# Patient Record
Sex: FEMALE | Race: WHITE | NOT HISPANIC OR LATINO | Employment: OTHER | URBAN - METROPOLITAN AREA
[De-identification: names, ages, dates, MRNs, and addresses within clinical notes are randomized per-mention and may not be internally consistent; named-entity substitution may affect disease eponyms.]

---

## 2023-06-22 ENCOUNTER — LAB (OUTPATIENT)
Dept: LAB | Facility: LAB | Age: 53
End: 2023-06-22
Payer: COMMERCIAL

## 2023-06-22 ENCOUNTER — OFFICE VISIT (OUTPATIENT)
Dept: PRIMARY CARE | Facility: CLINIC | Age: 53
End: 2023-06-22
Payer: COMMERCIAL

## 2023-06-22 VITALS
DIASTOLIC BLOOD PRESSURE: 98 MMHG | WEIGHT: 195 LBS | HEART RATE: 90 BPM | SYSTOLIC BLOOD PRESSURE: 186 MMHG | TEMPERATURE: 97.6 F

## 2023-06-22 DIAGNOSIS — Z00.00 ENCOUNTER FOR ROUTINE ADULT HEALTH EXAMINATION WITHOUT ABNORMAL FINDINGS: ICD-10-CM

## 2023-06-22 DIAGNOSIS — Z01.84 IMMUNITY STATUS TESTING: ICD-10-CM

## 2023-06-22 DIAGNOSIS — F41.1 GAD (GENERALIZED ANXIETY DISORDER): ICD-10-CM

## 2023-06-22 DIAGNOSIS — Z00.00 ENCOUNTER FOR ROUTINE ADULT HEALTH EXAMINATION WITHOUT ABNORMAL FINDINGS: Primary | ICD-10-CM

## 2023-06-22 DIAGNOSIS — I10 ESSENTIAL HYPERTENSION: ICD-10-CM

## 2023-06-22 DIAGNOSIS — Z23 ENCOUNTER FOR IMMUNIZATION: ICD-10-CM

## 2023-06-22 LAB
ALANINE AMINOTRANSFERASE (SGPT) (U/L) IN SER/PLAS: 21 U/L (ref 7–45)
ALBUMIN (G/DL) IN SER/PLAS: 4.8 G/DL (ref 3.4–5)
ALKALINE PHOSPHATASE (U/L) IN SER/PLAS: 70 U/L (ref 33–110)
ANION GAP IN SER/PLAS: 17 MMOL/L (ref 10–20)
ASPARTATE AMINOTRANSFERASE (SGOT) (U/L) IN SER/PLAS: 23 U/L (ref 9–39)
BASOPHILS (10*3/UL) IN BLOOD BY AUTOMATED COUNT: 0.06 X10E9/L (ref 0–0.1)
BASOPHILS/100 LEUKOCYTES IN BLOOD BY AUTOMATED COUNT: 1 % (ref 0–2)
BILIRUBIN TOTAL (MG/DL) IN SER/PLAS: 0.8 MG/DL (ref 0–1.2)
CALCIDIOL (25 OH VITAMIN D3) (NG/ML) IN SER/PLAS: 10 NG/ML
CALCIUM (MG/DL) IN SER/PLAS: 10 MG/DL (ref 8.6–10.6)
CARBON DIOXIDE, TOTAL (MMOL/L) IN SER/PLAS: 26 MMOL/L (ref 21–32)
CHLORIDE (MMOL/L) IN SER/PLAS: 98 MMOL/L (ref 98–107)
CHOLESTEROL (MG/DL) IN SER/PLAS: 291 MG/DL (ref 0–199)
CHOLESTEROL IN HDL (MG/DL) IN SER/PLAS: 27.3 MG/DL
CHOLESTEROL/HDL RATIO: 10.7
CREATININE (MG/DL) IN SER/PLAS: 0.52 MG/DL (ref 0.5–1.05)
EOSINOPHILS (10*3/UL) IN BLOOD BY AUTOMATED COUNT: 0.04 X10E9/L (ref 0–0.7)
EOSINOPHILS/100 LEUKOCYTES IN BLOOD BY AUTOMATED COUNT: 0.6 % (ref 0–6)
ERYTHROCYTE DISTRIBUTION WIDTH (RATIO) BY AUTOMATED COUNT: 12.7 % (ref 11.5–14.5)
ERYTHROCYTE MEAN CORPUSCULAR HEMOGLOBIN CONCENTRATION (G/DL) BY AUTOMATED: 34.7 G/DL (ref 32–36)
ERYTHROCYTE MEAN CORPUSCULAR VOLUME (FL) BY AUTOMATED COUNT: 88 FL (ref 80–100)
ERYTHROCYTES (10*6/UL) IN BLOOD BY AUTOMATED COUNT: 5.13 X10E12/L (ref 4–5.2)
ESTIMATED AVERAGE GLUCOSE FOR HBA1C: 209 MG/DL
GFR FEMALE: >90 ML/MIN/1.73M2
GLUCOSE (MG/DL) IN SER/PLAS: 226 MG/DL (ref 74–99)
HEMATOCRIT (%) IN BLOOD BY AUTOMATED COUNT: 44.9 % (ref 36–46)
HEMOGLOBIN (G/DL) IN BLOOD: 15.6 G/DL (ref 12–16)
HEMOGLOBIN A1C/HEMOGLOBIN TOTAL IN BLOOD: 8.9 %
HEPATITIS B VIRUS SURFACE AB (MIU/ML) IN SERUM: <3.1 MIU/ML
HEPATITIS B VIRUS SURFACE AG PRESENCE IN SERUM: NONREACTIVE
HEPATITIS C VIRUS AB PRESENCE IN SERUM: NONREACTIVE
IMMATURE GRANULOCYTES/100 LEUKOCYTES IN BLOOD BY AUTOMATED COUNT: 1.9 % (ref 0–0.9)
LDL: ABNORMAL MG/DL (ref 0–99)
LEUKOCYTES (10*3/UL) IN BLOOD BY AUTOMATED COUNT: 6.2 X10E9/L (ref 4.4–11.3)
LYMPHOCYTES (10*3/UL) IN BLOOD BY AUTOMATED COUNT: 2.33 X10E9/L (ref 1.2–4.8)
LYMPHOCYTES/100 LEUKOCYTES IN BLOOD BY AUTOMATED COUNT: 37.3 % (ref 13–44)
MONOCYTES (10*3/UL) IN BLOOD BY AUTOMATED COUNT: 0.35 X10E9/L (ref 0.1–1)
MONOCYTES/100 LEUKOCYTES IN BLOOD BY AUTOMATED COUNT: 5.6 % (ref 2–10)
NEUTROPHILS (10*3/UL) IN BLOOD BY AUTOMATED COUNT: 3.34 X10E9/L (ref 1.2–7.7)
NEUTROPHILS/100 LEUKOCYTES IN BLOOD BY AUTOMATED COUNT: 53.6 % (ref 40–80)
NRBC (PER 100 WBCS) BY AUTOMATED COUNT: 0 /100 WBC (ref 0–0)
PLATELETS (10*3/UL) IN BLOOD AUTOMATED COUNT: 155 X10E9/L (ref 150–450)
POTASSIUM (MMOL/L) IN SER/PLAS: 4.3 MMOL/L (ref 3.5–5.3)
PROTEIN TOTAL: 7.6 G/DL (ref 6.4–8.2)
SODIUM (MMOL/L) IN SER/PLAS: 137 MMOL/L (ref 136–145)
THYROTROPIN (MIU/L) IN SER/PLAS BY DETECTION LIMIT <= 0.05 MIU/L: 1.13 MIU/L (ref 0.44–3.98)
TRIGLYCERIDE (MG/DL) IN SER/PLAS: 1400 MG/DL (ref 0–149)
UREA NITROGEN (MG/DL) IN SER/PLAS: 14 MG/DL (ref 6–23)
VLDL: ABNORMAL MG/DL (ref 0–40)

## 2023-06-22 PROCEDURE — 93000 ELECTROCARDIOGRAM COMPLETE: CPT | Performed by: INTERNAL MEDICINE

## 2023-06-22 PROCEDURE — 36415 COLL VENOUS BLD VENIPUNCTURE: CPT

## 2023-06-22 PROCEDURE — 83721 ASSAY OF BLOOD LIPOPROTEIN: CPT

## 2023-06-22 PROCEDURE — 3077F SYST BP >= 140 MM HG: CPT | Performed by: INTERNAL MEDICINE

## 2023-06-22 PROCEDURE — 86803 HEPATITIS C AB TEST: CPT

## 2023-06-22 PROCEDURE — 90471 IMMUNIZATION ADMIN: CPT | Performed by: INTERNAL MEDICINE

## 2023-06-22 PROCEDURE — 80061 LIPID PANEL: CPT

## 2023-06-22 PROCEDURE — 99204 OFFICE O/P NEW MOD 45 MIN: CPT | Performed by: INTERNAL MEDICINE

## 2023-06-22 PROCEDURE — 82306 VITAMIN D 25 HYDROXY: CPT

## 2023-06-22 PROCEDURE — 85025 COMPLETE CBC W/AUTO DIFF WBC: CPT

## 2023-06-22 PROCEDURE — 83036 HEMOGLOBIN GLYCOSYLATED A1C: CPT

## 2023-06-22 PROCEDURE — 80053 COMPREHEN METABOLIC PANEL: CPT

## 2023-06-22 PROCEDURE — 3080F DIAST BP >= 90 MM HG: CPT | Performed by: INTERNAL MEDICINE

## 2023-06-22 PROCEDURE — 1036F TOBACCO NON-USER: CPT | Performed by: INTERNAL MEDICINE

## 2023-06-22 PROCEDURE — 90750 HZV VACC RECOMBINANT IM: CPT | Performed by: INTERNAL MEDICINE

## 2023-06-22 PROCEDURE — 81001 URINALYSIS AUTO W/SCOPE: CPT

## 2023-06-22 PROCEDURE — 86706 HEP B SURFACE ANTIBODY: CPT

## 2023-06-22 PROCEDURE — 84443 ASSAY THYROID STIM HORMONE: CPT

## 2023-06-22 PROCEDURE — 87340 HEPATITIS B SURFACE AG IA: CPT

## 2023-06-22 RX ORDER — ESCITALOPRAM OXALATE 10 MG/1
10 TABLET ORAL DAILY
Qty: 30 TABLET | Refills: 5 | Status: SHIPPED | OUTPATIENT
Start: 2023-06-22 | End: 2023-06-23 | Stop reason: SDUPTHER

## 2023-06-22 RX ORDER — POTASSIUM CHLORIDE 20 MEQ/1
1 TABLET, EXTENDED RELEASE ORAL DAILY
COMMUNITY
Start: 2012-09-25 | End: 2023-06-22 | Stop reason: WASHOUT

## 2023-06-22 RX ORDER — HYDROCHLOROTHIAZIDE 12.5 MG/1
12.5 TABLET ORAL
COMMUNITY
End: 2023-06-22 | Stop reason: WASHOUT

## 2023-06-22 RX ORDER — GLUCOSAM/CHONDRO/HERB 149/HYAL 750-100 MG
2 TABLET ORAL 2 TIMES DAILY
COMMUNITY

## 2023-06-22 RX ORDER — ESOMEPRAZOLE MAGNESIUM 40 MG/1
40 CAPSULE, DELAYED RELEASE ORAL
COMMUNITY
End: 2023-06-22 | Stop reason: WASHOUT

## 2023-06-22 RX ORDER — ESOMEPRAZOLE MAGNESIUM 40 MG/1
1 CAPSULE, DELAYED RELEASE ORAL DAILY
COMMUNITY
Start: 2012-09-20 | End: 2023-06-22 | Stop reason: WASHOUT

## 2023-06-22 RX ORDER — OLMESARTAN MEDOXOMIL 20 MG/1
20 TABLET ORAL DAILY
Qty: 30 TABLET | Refills: 5 | Status: SHIPPED | OUTPATIENT
Start: 2023-06-22 | End: 2023-06-23 | Stop reason: SDUPTHER

## 2023-06-22 RX ORDER — HYDROCHLOROTHIAZIDE 12.5 MG/1
1 TABLET ORAL DAILY
COMMUNITY
Start: 2013-03-21 | End: 2023-06-22 | Stop reason: WASHOUT

## 2023-06-22 RX ORDER — ESCITALOPRAM OXALATE 10 MG/1
TABLET ORAL
COMMUNITY
Start: 2013-06-12 | End: 2023-06-22 | Stop reason: WASHOUT

## 2023-06-22 RX ORDER — POTASSIUM CHLORIDE 750 MG/1
10 TABLET, FILM COATED, EXTENDED RELEASE ORAL
COMMUNITY
Start: 2013-06-12 | End: 2023-06-22 | Stop reason: WASHOUT

## 2023-06-22 ASSESSMENT — ANXIETY QUESTIONNAIRES
5. BEING SO RESTLESS THAT IT IS HARD TO SIT STILL: SEVERAL DAYS
IF YOU CHECKED OFF ANY PROBLEMS ON THIS QUESTIONNAIRE, HOW DIFFICULT HAVE THESE PROBLEMS MADE IT FOR YOU TO DO YOUR WORK, TAKE CARE OF THINGS AT HOME, OR GET ALONG WITH OTHER PEOPLE: SOMEWHAT DIFFICULT
2. NOT BEING ABLE TO STOP OR CONTROL WORRYING: NEARLY EVERY DAY
7. FEELING AFRAID AS IF SOMETHING AWFUL MIGHT HAPPEN: MORE THAN HALF THE DAYS
GAD7 TOTAL SCORE: 15
4. TROUBLE RELAXING: MORE THAN HALF THE DAYS
6. BECOMING EASILY ANNOYED OR IRRITABLE: SEVERAL DAYS
3. WORRYING TOO MUCH ABOUT DIFFERENT THINGS: NEARLY EVERY DAY
1. FEELING NERVOUS, ANXIOUS, OR ON EDGE: NEARLY EVERY DAY
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
## 2023-06-22 NOTE — PATIENT INSTRUCTIONS
It was a pleasure to see you today! Here is a list of things we have discussed and to follow up on:    I have ordered blood and/or urine tests for you to do today. The lab can be found on this floor (2nd floor) next to the pharmacy across from the elevators.    For blood pressure, I have started you on OLMESARTAN. Take 1 tablet by mouth daily.   Please check another blood test in 2 weeks to make sure there are no significant abnormalities   For anxiety, we are restarting LEXAPRO (generic is called escitalopram). Take 1 tablet by mouth daily   Check in with me in about 1 month's time to see how you are feeling. You can send me a message via [employer] or call the office  Followup with me in 1-2 months for your preventive care visit.

## 2023-06-23 ENCOUNTER — TELEPHONE (OUTPATIENT)
Dept: PRIMARY CARE | Facility: CLINIC | Age: 53
End: 2023-06-23
Payer: COMMERCIAL

## 2023-06-23 DIAGNOSIS — I10 ESSENTIAL HYPERTENSION: ICD-10-CM

## 2023-06-23 DIAGNOSIS — E78.1 HYPERTRIGLYCERIDEMIA: Primary | ICD-10-CM

## 2023-06-23 DIAGNOSIS — F41.1 GAD (GENERALIZED ANXIETY DISORDER): ICD-10-CM

## 2023-06-23 LAB
APPEARANCE, URINE: ABNORMAL
BILIRUBIN, URINE: NEGATIVE
BLOOD, URINE: NEGATIVE
CHOLESTEROL IN LDL (MG/DL) IN SER/PLAS BY DIRECT ASSAY: 71 MG/DL (ref 0–129)
COLOR, URINE: YELLOW
GLUCOSE, URINE: NEGATIVE MG/DL
KETONES, URINE: ABNORMAL MG/DL
LEUKOCYTE ESTERASE, URINE: ABNORMAL
MUCUS, URINE: NORMAL /LPF
NITRITE, URINE: NEGATIVE
PH, URINE: 5 (ref 5–8)
PROTEIN, URINE: ABNORMAL MG/DL
RBC, URINE: 1 /HPF (ref 0–5)
SPECIFIC GRAVITY, URINE: 1.02 (ref 1–1.03)
SQUAMOUS EPITHELIAL CELLS, URINE: 5 /HPF
TRANSITIONAL EPITHELIAL CELLS, URINE: <1 /HPF
URIC ACID (URATE) CRYSTALS, URINE: NORMAL /HPF
UROBILINOGEN, URINE: <2 MG/DL (ref 0–1.9)
WBC, URINE: 5 /HPF (ref 0–5)

## 2023-06-23 RX ORDER — OLMESARTAN MEDOXOMIL 20 MG/1
20 TABLET ORAL DAILY
Qty: 90 TABLET | Refills: 0 | Status: SHIPPED | OUTPATIENT
Start: 2023-06-23 | End: 2023-06-23 | Stop reason: SDUPTHER

## 2023-06-23 RX ORDER — ESCITALOPRAM OXALATE 10 MG/1
10 TABLET ORAL DAILY
Qty: 90 TABLET | Refills: 0 | Status: SHIPPED | OUTPATIENT
Start: 2023-06-23 | End: 2023-08-14 | Stop reason: SDUPTHER

## 2023-06-23 RX ORDER — OLMESARTAN MEDOXOMIL 20 MG/1
20 TABLET ORAL DAILY
Qty: 90 TABLET | Refills: 0 | Status: SHIPPED | OUTPATIENT
Start: 2023-06-23 | End: 2023-08-14 | Stop reason: SDUPTHER

## 2023-06-23 RX ORDER — ESCITALOPRAM OXALATE 10 MG/1
10 TABLET ORAL DAILY
Qty: 90 TABLET | Refills: 0 | Status: SHIPPED | OUTPATIENT
Start: 2023-06-23 | End: 2023-06-23 | Stop reason: SDUPTHER

## 2023-06-23 NOTE — TELEPHONE ENCOUNTER
Issue with rx at [facility], [facility] reach [facility] by [facility]. Will send rx to [facility].

## 2023-07-05 ENCOUNTER — LAB (OUTPATIENT)
Dept: LAB | Facility: LAB | Age: 53
End: 2023-07-05
Payer: COMMERCIAL

## 2023-07-05 DIAGNOSIS — E78.1 HYPERTRIGLYCERIDEMIA: ICD-10-CM

## 2023-07-05 LAB
CHOLESTEROL (MG/DL) IN SER/PLAS: 235 MG/DL (ref 0–199)
CHOLESTEROL IN HDL (MG/DL) IN SER/PLAS: 28.2 MG/DL
CHOLESTEROL/HDL RATIO: 8.3
LDL: ABNORMAL MG/DL (ref 0–99)
TRIGLYCERIDE (MG/DL) IN SER/PLAS: 671 MG/DL (ref 0–149)
VLDL: ABNORMAL MG/DL (ref 0–40)

## 2023-07-05 PROCEDURE — 36415 COLL VENOUS BLD VENIPUNCTURE: CPT

## 2023-07-05 PROCEDURE — 80061 LIPID PANEL: CPT

## 2023-07-10 DIAGNOSIS — E11.9 DIABETES MELLITUS TYPE 2, NONINSULIN DEPENDENT (MULTI): ICD-10-CM

## 2023-07-10 DIAGNOSIS — E78.2 MODERATE MIXED HYPERLIPIDEMIA NOT REQUIRING STATIN THERAPY: Primary | ICD-10-CM

## 2023-07-10 RX ORDER — ROSUVASTATIN CALCIUM 20 MG/1
20 TABLET, COATED ORAL DAILY
Qty: 90 TABLET | Refills: 0 | Status: SHIPPED | OUTPATIENT
Start: 2023-07-10 | End: 2023-10-02

## 2023-07-10 NOTE — PROGRESS NOTES
Briefly spoke to the patient over the phone.   Persistent hypertriglyceridemia with significant improvement in triglyceride count, now down to around 600 from >1000. She has been making aggressive lifestyle interventions and notes improvement in symptoms of fatigue, depression and has more energy. Has had no significant side effects on escitalopram  with significantly improved depressive symptoms and no significant side effects. Blood pressure readings now averaging 120s/70s on olmesartan.   Will initiate rosuvastatin 20mg given high ASCVD risk with mildly elevated LDL levels. Potential side effects reviewed.   Will also refer to nutritionist. Name and number provided. Patient expressed understanding and agreeable to plan.

## 2023-08-14 DIAGNOSIS — F41.1 GAD (GENERALIZED ANXIETY DISORDER): ICD-10-CM

## 2023-08-14 DIAGNOSIS — I10 ESSENTIAL HYPERTENSION: ICD-10-CM

## 2023-08-14 RX ORDER — ESCITALOPRAM OXALATE 10 MG/1
10 TABLET ORAL DAILY
Qty: 90 TABLET | Refills: 0 | Status: SHIPPED | OUTPATIENT
Start: 2023-08-14 | End: 2023-11-13

## 2023-08-14 RX ORDER — OLMESARTAN MEDOXOMIL 20 MG/1
20 TABLET ORAL DAILY
Qty: 90 TABLET | Refills: 0 | Status: SHIPPED | OUTPATIENT
Start: 2023-08-14 | End: 2023-11-13

## 2023-09-05 DIAGNOSIS — E55.9 VITAMIN D DEFICIENCY: ICD-10-CM

## 2023-09-05 DIAGNOSIS — E11.9 TYPE 2 DIABETES MELLITUS WITHOUT COMPLICATION, WITHOUT LONG-TERM CURRENT USE OF INSULIN (MULTI): ICD-10-CM

## 2023-09-05 DIAGNOSIS — E78.5 HYPERLIPIDEMIA, UNSPECIFIED HYPERLIPIDEMIA TYPE: Primary | ICD-10-CM

## 2023-09-06 ENCOUNTER — LAB (OUTPATIENT)
Dept: LAB | Facility: LAB | Age: 53
End: 2023-09-06
Payer: COMMERCIAL

## 2023-09-06 DIAGNOSIS — E11.9 TYPE 2 DIABETES MELLITUS WITHOUT COMPLICATION, WITHOUT LONG-TERM CURRENT USE OF INSULIN (MULTI): ICD-10-CM

## 2023-09-06 DIAGNOSIS — E55.9 VITAMIN D DEFICIENCY: ICD-10-CM

## 2023-09-06 DIAGNOSIS — E78.5 HYPERLIPIDEMIA, UNSPECIFIED HYPERLIPIDEMIA TYPE: ICD-10-CM

## 2023-09-06 LAB
ALBUMIN (MG/L) IN URINE: 29.9 MG/L
ALBUMIN/CREATININE (UG/MG) IN URINE: 20.3 UG/MG CRT (ref 0–30)
CALCIDIOL (25 OH VITAMIN D3) (NG/ML) IN SER/PLAS: 26 NG/ML
CHOLESTEROL (MG/DL) IN SER/PLAS: 127 MG/DL (ref 0–199)
CHOLESTEROL IN HDL (MG/DL) IN SER/PLAS: 28.9 MG/DL
CHOLESTEROL/HDL RATIO: 4.4
CREATININE (MG/DL) IN URINE: 147 MG/DL (ref 20–320)
NON-HDL CHOLESTEROL: 98 MG/DL

## 2023-09-06 PROCEDURE — 82043 UR ALBUMIN QUANTITATIVE: CPT

## 2023-09-06 PROCEDURE — 82306 VITAMIN D 25 HYDROXY: CPT

## 2023-09-06 PROCEDURE — 80061 LIPID PANEL: CPT

## 2023-09-06 PROCEDURE — 36415 COLL VENOUS BLD VENIPUNCTURE: CPT

## 2023-09-06 PROCEDURE — 82570 ASSAY OF URINE CREATININE: CPT

## 2023-09-08 LAB
CHOLESTEROL (MG/DL) IN SER/PLAS: 127 MG/DL (ref 0–199)
CHOLESTEROL IN HDL (MG/DL) IN SER/PLAS: 28.9 MG/DL
CHOLESTEROL/HDL RATIO: 4.4
LDL: ABNORMAL MG/DL (ref 0–99)
NON HDL CHOLESTEROL: 98 MG/DL
TRIGLYCERIDE (MG/DL) IN SER/PLAS: 416 MG/DL (ref 0–149)
VLDL: ABNORMAL MG/DL (ref 0–40)

## 2023-09-12 ENCOUNTER — OFFICE VISIT (OUTPATIENT)
Dept: PRIMARY CARE | Facility: CLINIC | Age: 53
End: 2023-09-12
Payer: COMMERCIAL

## 2023-09-12 VITALS
TEMPERATURE: 97 F | HEART RATE: 88 BPM | DIASTOLIC BLOOD PRESSURE: 72 MMHG | SYSTOLIC BLOOD PRESSURE: 126 MMHG | WEIGHT: 188.25 LBS

## 2023-09-12 DIAGNOSIS — Z12.31 ENCOUNTER FOR SCREENING MAMMOGRAM FOR MALIGNANT NEOPLASM OF BREAST: ICD-10-CM

## 2023-09-12 DIAGNOSIS — E11.9 TYPE 2 DIABETES MELLITUS WITHOUT COMPLICATION, WITHOUT LONG-TERM CURRENT USE OF INSULIN (MULTI): ICD-10-CM

## 2023-09-12 DIAGNOSIS — I10 ESSENTIAL HYPERTENSION: ICD-10-CM

## 2023-09-12 DIAGNOSIS — Z23 IMMUNIZATION DUE: ICD-10-CM

## 2023-09-12 DIAGNOSIS — K21.9 GASTROESOPHAGEAL REFLUX DISEASE, UNSPECIFIED WHETHER ESOPHAGITIS PRESENT: Primary | ICD-10-CM

## 2023-09-12 DIAGNOSIS — E78.2 MIXED HYPERLIPIDEMIA: ICD-10-CM

## 2023-09-12 DIAGNOSIS — Z12.11 COLON CANCER SCREENING: ICD-10-CM

## 2023-09-12 DIAGNOSIS — F41.1 GAD (GENERALIZED ANXIETY DISORDER): ICD-10-CM

## 2023-09-12 PROBLEM — K59.09 OTHER CONSTIPATION: Status: RESOLVED | Noted: 2023-09-12 | Resolved: 2023-09-12

## 2023-09-12 PROBLEM — K59.09 OTHER CONSTIPATION: Status: ACTIVE | Noted: 2023-09-12

## 2023-09-12 PROCEDURE — 90677 PCV20 VACCINE IM: CPT | Performed by: INTERNAL MEDICINE

## 2023-09-12 PROCEDURE — 3078F DIAST BP <80 MM HG: CPT | Performed by: INTERNAL MEDICINE

## 2023-09-12 PROCEDURE — 90471 IMMUNIZATION ADMIN: CPT | Performed by: INTERNAL MEDICINE

## 2023-09-12 PROCEDURE — 90750 HZV VACC RECOMBINANT IM: CPT | Performed by: INTERNAL MEDICINE

## 2023-09-12 PROCEDURE — 90472 IMMUNIZATION ADMIN EACH ADD: CPT | Performed by: INTERNAL MEDICINE

## 2023-09-12 PROCEDURE — 3052F HG A1C>EQUAL 8.0%<EQUAL 9.0%: CPT | Performed by: INTERNAL MEDICINE

## 2023-09-12 PROCEDURE — 1036F TOBACCO NON-USER: CPT | Performed by: INTERNAL MEDICINE

## 2023-09-12 PROCEDURE — 99214 OFFICE O/P EST MOD 30 MIN: CPT | Performed by: INTERNAL MEDICINE

## 2023-09-12 PROCEDURE — 4010F ACE/ARB THERAPY RXD/TAKEN: CPT | Performed by: INTERNAL MEDICINE

## 2023-09-12 PROCEDURE — 3074F SYST BP LT 130 MM HG: CPT | Performed by: INTERNAL MEDICINE

## 2023-09-12 RX ORDER — LANCETS
EACH MISCELLANEOUS
Qty: 100 EACH | Refills: 3 | Status: SHIPPED | OUTPATIENT
Start: 2023-09-12 | End: 2023-09-12

## 2023-09-12 RX ORDER — PANTOPRAZOLE SODIUM 40 MG/1
40 TABLET, DELAYED RELEASE ORAL DAILY
Qty: 30 TABLET | Refills: 11 | Status: SHIPPED | OUTPATIENT
Start: 2023-09-12 | End: 2023-09-12 | Stop reason: SDUPTHER

## 2023-09-12 RX ORDER — BLOOD SUGAR DIAGNOSTIC
STRIP MISCELLANEOUS
Qty: 100 STRIP | Refills: 5 | Status: SHIPPED | OUTPATIENT
Start: 2023-09-12 | End: 2023-09-13

## 2023-09-12 RX ORDER — LANCETS 33 GAUGE
EACH MISCELLANEOUS
Qty: 100 EACH | Refills: 3 | Status: SHIPPED | OUTPATIENT
Start: 2023-09-12

## 2023-09-12 RX ORDER — PANTOPRAZOLE SODIUM 40 MG/1
40 TABLET, DELAYED RELEASE ORAL DAILY
Qty: 90 TABLET | Refills: 1 | Status: SHIPPED | OUTPATIENT
Start: 2023-09-12 | End: 2024-02-05

## 2023-09-12 ASSESSMENT — ENCOUNTER SYMPTOMS
HEADACHES: 0
NECK PAIN: 0
PALPITATIONS: 0
SWEATS: 0
ORTHOPNEA: 0
BLURRED VISION: 0
HYPERTENSION: 1
PND: 0
SHORTNESS OF BREATH: 0

## 2023-09-12 ASSESSMENT — ANXIETY QUESTIONNAIRES
1. FEELING NERVOUS, ANXIOUS, OR ON EDGE: SEVERAL DAYS
4. TROUBLE RELAXING: NOT AT ALL
6. BECOMING EASILY ANNOYED OR IRRITABLE: NOT AT ALL
7. FEELING AFRAID AS IF SOMETHING AWFUL MIGHT HAPPEN: SEVERAL DAYS
5. BEING SO RESTLESS THAT IT IS HARD TO SIT STILL: SEVERAL DAYS
GAD7 TOTAL SCORE: 4
IF YOU CHECKED OFF ANY PROBLEMS ON THIS QUESTIONNAIRE, HOW DIFFICULT HAVE THESE PROBLEMS MADE IT FOR YOU TO DO YOUR WORK, TAKE CARE OF THINGS AT HOME, OR GET ALONG WITH OTHER PEOPLE: SOMEWHAT DIFFICULT
2. NOT BEING ABLE TO STOP OR CONTROL WORRYING: SEVERAL DAYS
3. WORRYING TOO MUCH ABOUT DIFFERENT THINGS: NOT AT ALL

## 2023-09-12 NOTE — PATIENT INSTRUCTIONS
It was a pleasure to see you today! Here is a list of things we have discussed and to follow up on:    Gynecology referral - I recommend Dr. Helen Hernandez ([phone]), Dr. Yesi Garrett ([phone])  or Dr. Val Patterson ([phone]).   Colonoscopy is ordered - please call [phone] to have it scheduled. Otherwise, someone should be reaching out to you.   Mammogram - Call [phone] to have this scheduled.   Start Vitamin D3 1000 international units per day in addition to your multivitamin.    Diabetes   Start low dose OZEMPIC 0.25mg once per week for 4 weeks   If tolerating, message me and we will uptitrate to 0.5mg weekly   I have sent blood sugar testing supplies to your pharmacy   Schedule ophthalmology visit to get a diabetic eye exam   Cholesterol   Increase your fish oil to 4g/day if possible.   Followup in 3 months

## 2023-09-12 NOTE — ASSESSMENT & PLAN NOTE
Chronic with significant improvement with OTC PPI, will prescribe PPI for improvement in symptoms.   Has had notable erosions on EGD in the past without ulcers and biopsies unremarkable in 2012
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
## 2023-09-12 NOTE — ASSESSMENT & PLAN NOTE
With some improvement in symptoms on Lexapro 10mg, declines BHI referral at present. YAZMIN 7 completed. For now, will continue to take current dose and monitor for improvement in symptoms or if needs to address further.

## 2023-09-13 RX ORDER — BLOOD SUGAR DIAGNOSTIC
STRIP MISCELLANEOUS
Qty: 100 STRIP | Refills: 5 | Status: SHIPPED | OUTPATIENT
Start: 2023-09-13 | End: 2024-02-05

## 2023-09-13 NOTE — ASSESSMENT & PLAN NOTE
With improvement in triglyceride count though not to normal levels thus far. Continue lipid lowering therapy with rosuvastatin for now, can add higher dose EPA+DHA at 4g/day in addition to lifestyle modifications.

## 2023-09-13 NOTE — ASSESSMENT & PLAN NOTE
With history of impaction now improved with intermittent maintenance of miralax. He is due for a colonoscopy.

## 2023-09-13 NOTE — ASSESSMENT & PLAN NOTE
Confirmed with A1C elevation in addition to fingerstick >200. Has been mildly improving with lifestyle modifications. Discussed consideration for management and various recommendations.   Continue lifestyle modifications. Will initiate GLP-1 agonist at low dose and uptitrate as tolerated. Potential side effects and management expectations reviewed. Will followup in 3 months.   Ensure she receives diabetic eye exam.   On statin   No albuminuria   Prevnar 20 today.

## 2023-09-14 PROBLEM — R73.01 IMPAIRED FASTING GLUCOSE: Status: ACTIVE | Noted: 2023-09-14

## 2023-09-14 PROBLEM — R10.9 ABDOMINAL PAIN: Status: ACTIVE | Noted: 2023-09-14

## 2023-09-14 PROBLEM — E78.5 DYSLIPIDEMIA: Status: ACTIVE | Noted: 2023-09-14

## 2023-09-14 PROBLEM — E55.9 VITAMIN D DEFICIENCY: Status: ACTIVE | Noted: 2023-09-14

## 2023-09-14 PROBLEM — M25.50 ARTHRALGIA: Status: ACTIVE | Noted: 2023-09-14

## 2023-09-14 PROBLEM — R53.83 FATIGUE: Status: ACTIVE | Noted: 2023-09-14

## 2023-09-14 PROBLEM — E74.39 OTHER DISORDERS OF INTESTINAL CARBOHYDRATE ABSORPTION: Status: ACTIVE | Noted: 2023-09-14

## 2023-09-14 PROBLEM — E53.8 VITAMIN B12 DEFICIENCY: Status: ACTIVE | Noted: 2023-09-14

## 2023-09-14 PROBLEM — A09 TRAVELERS' DIARRHEA: Status: ACTIVE | Noted: 2023-09-14

## 2023-09-14 PROBLEM — K42.9 UMBILICAL HERNIA: Status: ACTIVE | Noted: 2023-09-14

## 2023-09-14 RX ORDER — CIPROFLOXACIN 500 MG/1
1 TABLET ORAL 2 TIMES DAILY
COMMUNITY
Start: 2015-02-10 | End: 2023-12-28 | Stop reason: ALTCHOICE

## 2023-09-14 RX ORDER — CYANOCOBALAMIN 1000 UG/ML
INJECTION, SOLUTION INTRAMUSCULAR; SUBCUTANEOUS
COMMUNITY
Start: 2013-06-12 | End: 2023-12-28 | Stop reason: ALTCHOICE

## 2023-09-14 RX ORDER — HYDROCHLOROTHIAZIDE 12.5 MG/1
1 CAPSULE ORAL DAILY
COMMUNITY
Start: 2012-10-16 | End: 2023-12-28 | Stop reason: WASHOUT

## 2023-09-14 RX ORDER — MULTIVITAMIN
1 TABLET ORAL DAILY
COMMUNITY

## 2023-09-14 RX ORDER — TALC
3 POWDER (GRAM) TOPICAL NIGHTLY PRN
COMMUNITY

## 2023-09-14 RX ORDER — ESOMEPRAZOLE MAGNESIUM 40 MG/1
1 CAPSULE, DELAYED RELEASE ORAL DAILY
COMMUNITY
Start: 2012-09-20 | End: 2023-12-28 | Stop reason: WASHOUT

## 2023-09-14 RX ORDER — POTASSIUM CHLORIDE 20 MEQ/1
1 TABLET, EXTENDED RELEASE ORAL DAILY
COMMUNITY
Start: 2012-09-25 | End: 2023-12-28 | Stop reason: WASHOUT

## 2023-09-19 DIAGNOSIS — F41.9 ANXIETY DUE TO INVASIVE PROCEDURE: Primary | ICD-10-CM

## 2023-09-19 RX ORDER — ALPRAZOLAM 0.25 MG/1
TABLET ORAL
Qty: 6 TABLET | Refills: 0 | Status: SHIPPED | OUTPATIENT
Start: 2023-09-19 | End: 2024-01-11 | Stop reason: HOSPADM

## 2023-09-20 NOTE — PROGRESS NOTES
Preprocedural anxiety - rx'd prescription for limited course of alprazolam. Potential side effects reviewed during the visit with the patient

## 2023-09-30 DIAGNOSIS — E11.9 DIABETES MELLITUS TYPE 2, NONINSULIN DEPENDENT (MULTI): ICD-10-CM

## 2023-09-30 DIAGNOSIS — E78.2 MODERATE MIXED HYPERLIPIDEMIA NOT REQUIRING STATIN THERAPY: ICD-10-CM

## 2023-10-02 RX ORDER — ROSUVASTATIN CALCIUM 20 MG/1
20 TABLET, COATED ORAL DAILY
Qty: 90 TABLET | Refills: 0 | Status: SHIPPED | OUTPATIENT
Start: 2023-10-02 | End: 2023-12-28

## 2023-10-04 ENCOUNTER — ANCILLARY PROCEDURE (OUTPATIENT)
Dept: RADIOLOGY | Facility: CLINIC | Age: 53
End: 2023-10-04
Payer: COMMERCIAL

## 2023-10-04 DIAGNOSIS — Z12.31 ENCOUNTER FOR SCREENING MAMMOGRAM FOR MALIGNANT NEOPLASM OF BREAST: ICD-10-CM

## 2023-10-04 PROCEDURE — 77063 BREAST TOMOSYNTHESIS BI: CPT | Mod: 50

## 2023-10-04 PROCEDURE — 77063 BREAST TOMOSYNTHESIS BI: CPT | Mod: BILATERAL PROCEDURE | Performed by: RADIOLOGY

## 2023-10-04 PROCEDURE — 77067 SCR MAMMO BI INCL CAD: CPT | Mod: BILATERAL PROCEDURE | Performed by: RADIOLOGY

## 2023-10-05 DIAGNOSIS — E11.9 TYPE 2 DIABETES MELLITUS WITHOUT COMPLICATION, WITHOUT LONG-TERM CURRENT USE OF INSULIN (MULTI): Primary | ICD-10-CM

## 2023-10-05 DIAGNOSIS — N63.10 MASS OF RIGHT BREAST, UNSPECIFIED QUADRANT: ICD-10-CM

## 2023-10-12 ENCOUNTER — ANCILLARY PROCEDURE (OUTPATIENT)
Dept: RADIOLOGY | Facility: CLINIC | Age: 53
End: 2023-10-12
Payer: COMMERCIAL

## 2023-10-12 DIAGNOSIS — N63.10 MASS OF RIGHT BREAST, UNSPECIFIED QUADRANT: ICD-10-CM

## 2023-10-12 DIAGNOSIS — R92.8 ABNORMAL FINDINGS ON DIAGNOSTIC IMAGING OF BREAST: Primary | ICD-10-CM

## 2023-10-12 PROCEDURE — 77061 BREAST TOMOSYNTHESIS UNI: CPT | Mod: RT

## 2023-10-12 PROCEDURE — 76982 USE 1ST TARGET LESION: CPT | Mod: RIGHT SIDE | Performed by: RADIOLOGY

## 2023-10-12 PROCEDURE — 77065 DX MAMMO INCL CAD UNI: CPT | Mod: RIGHT SIDE | Performed by: RADIOLOGY

## 2023-10-12 PROCEDURE — 77061 BREAST TOMOSYNTHESIS UNI: CPT | Mod: RIGHT SIDE | Performed by: RADIOLOGY

## 2023-10-12 PROCEDURE — 76642 ULTRASOUND BREAST LIMITED: CPT | Mod: RT

## 2023-10-12 PROCEDURE — 76642 ULTRASOUND BREAST LIMITED: CPT | Mod: RIGHT SIDE | Performed by: RADIOLOGY

## 2023-10-17 ENCOUNTER — APPOINTMENT (OUTPATIENT)
Dept: NUTRITION | Facility: CLINIC | Age: 53
End: 2023-10-17
Payer: COMMERCIAL

## 2023-10-17 ENCOUNTER — OFFICE VISIT (OUTPATIENT)
Dept: GASTROENTEROLOGY | Facility: EXTERNAL LOCATION | Age: 53
End: 2023-10-17
Payer: COMMERCIAL

## 2023-10-17 DIAGNOSIS — Z12.11 ENCOUNTER FOR SCREENING FOR MALIGNANT NEOPLASM OF COLON: ICD-10-CM

## 2023-10-17 DIAGNOSIS — I10 BENIGN HYPERTENSION: ICD-10-CM

## 2023-10-17 DIAGNOSIS — D12.5 BENIGN NEOPLASM OF SIGMOID COLON: Primary | ICD-10-CM

## 2023-10-17 DIAGNOSIS — D12.9 BENIGN NEOPLASM OF RECTUM AND ANAL CANAL: ICD-10-CM

## 2023-10-17 DIAGNOSIS — D12.2 BENIGN NEOPLASM OF ASCENDING COLON: ICD-10-CM

## 2023-10-17 DIAGNOSIS — D12.8 BENIGN NEOPLASM OF RECTUM AND ANAL CANAL: ICD-10-CM

## 2023-10-17 PROCEDURE — 3052F HG A1C>EQUAL 8.0%<EQUAL 9.0%: CPT | Performed by: INTERNAL MEDICINE

## 2023-10-17 PROCEDURE — 88305 TISSUE EXAM BY PATHOLOGIST: CPT

## 2023-10-17 PROCEDURE — 45380 COLONOSCOPY AND BIOPSY: CPT | Performed by: INTERNAL MEDICINE

## 2023-10-17 PROCEDURE — 1036F TOBACCO NON-USER: CPT | Performed by: INTERNAL MEDICINE

## 2023-10-17 PROCEDURE — 4010F ACE/ARB THERAPY RXD/TAKEN: CPT | Performed by: INTERNAL MEDICINE

## 2023-10-17 PROCEDURE — 88305 TISSUE EXAM BY PATHOLOGIST: CPT | Performed by: PATHOLOGY

## 2023-10-18 ENCOUNTER — LAB REQUISITION (OUTPATIENT)
Dept: LAB | Facility: HOSPITAL | Age: 53
End: 2023-10-18
Payer: COMMERCIAL

## 2023-10-19 DIAGNOSIS — K42.9 UMBILICAL HERNIA WITHOUT OBSTRUCTION AND WITHOUT GANGRENE: Primary | ICD-10-CM

## 2023-10-27 LAB
LABORATORY COMMENT REPORT: NORMAL
PATH REPORT.FINAL DX SPEC: NORMAL
PATH REPORT.GROSS SPEC: NORMAL
PATH REPORT.RELEVANT HX SPEC: NORMAL
PATH REPORT.TOTAL CANCER: NORMAL

## 2023-11-06 ENCOUNTER — OFFICE VISIT (OUTPATIENT)
Dept: SURGERY | Facility: CLINIC | Age: 53
End: 2023-11-06
Payer: COMMERCIAL

## 2023-11-06 VITALS — HEIGHT: 65 IN | BODY MASS INDEX: 30.09 KG/M2 | WEIGHT: 180.6 LBS

## 2023-11-06 DIAGNOSIS — K42.9 UMBILICAL HERNIA WITHOUT OBSTRUCTION AND WITHOUT GANGRENE: ICD-10-CM

## 2023-11-06 DIAGNOSIS — K43.2 VENTRAL INCISIONAL HERNIA: Primary | ICD-10-CM

## 2023-11-06 PROCEDURE — 3078F DIAST BP <80 MM HG: CPT | Performed by: SURGERY

## 2023-11-06 PROCEDURE — 3052F HG A1C>EQUAL 8.0%<EQUAL 9.0%: CPT | Performed by: SURGERY

## 2023-11-06 PROCEDURE — 3074F SYST BP LT 130 MM HG: CPT | Performed by: SURGERY

## 2023-11-06 PROCEDURE — 99204 OFFICE O/P NEW MOD 45 MIN: CPT | Performed by: SURGERY

## 2023-11-06 PROCEDURE — 4010F ACE/ARB THERAPY RXD/TAKEN: CPT | Performed by: SURGERY

## 2023-11-06 PROCEDURE — 1036F TOBACCO NON-USER: CPT | Performed by: SURGERY

## 2023-11-06 RX ORDER — SODIUM CHLORIDE, SODIUM LACTATE, POTASSIUM CHLORIDE, CALCIUM CHLORIDE 600; 310; 30; 20 MG/100ML; MG/100ML; MG/100ML; MG/100ML
100 INJECTION, SOLUTION INTRAVENOUS CONTINUOUS
Status: CANCELLED | OUTPATIENT
Start: 2023-11-06

## 2023-11-06 ASSESSMENT — PAIN SCALES - GENERAL: PAINLEVEL: 0-NO PAIN

## 2023-11-06 NOTE — LETTER
Dr Richard-    Thank you for referring Emperatriz Carter to us.  She has a chronically incarcerated periumbilical hernia.  This will be scheduled for robotic assisted surgical correction at [facility]. Will keep you posted.    dinorah
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
## 2023-11-11 DIAGNOSIS — F41.1 GAD (GENERALIZED ANXIETY DISORDER): ICD-10-CM

## 2023-11-11 DIAGNOSIS — I10 ESSENTIAL HYPERTENSION: ICD-10-CM

## 2023-11-13 RX ORDER — OLMESARTAN MEDOXOMIL 20 MG/1
20 TABLET ORAL DAILY
Qty: 90 TABLET | Refills: 0 | Status: SHIPPED | OUTPATIENT
Start: 2023-11-13 | End: 2024-02-12

## 2023-11-13 RX ORDER — ESCITALOPRAM OXALATE 10 MG/1
10 TABLET ORAL DAILY
Qty: 90 TABLET | Refills: 0 | Status: SHIPPED | OUTPATIENT
Start: 2023-11-13 | End: 2024-02-12
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
## 2023-11-28 ENCOUNTER — NUTRITION (OUTPATIENT)
Dept: NUTRITION | Facility: CLINIC | Age: 53
End: 2023-11-28
Payer: COMMERCIAL

## 2023-11-28 VITALS — HEIGHT: 65 IN | BODY MASS INDEX: 30.34 KG/M2 | WEIGHT: 182.1 LBS

## 2023-11-28 DIAGNOSIS — E78.2 MIXED HYPERLIPIDEMIA: ICD-10-CM

## 2023-11-28 DIAGNOSIS — E11.9 TYPE 2 DIABETES MELLITUS WITHOUT COMPLICATION, UNSPECIFIED WHETHER LONG TERM INSULIN USE (MULTI): Primary | ICD-10-CM

## 2023-11-28 PROCEDURE — 97803 MED NUTRITION INDIV SUBSEQ: CPT | Performed by: DIETITIAN, REGISTERED

## 2023-11-28 NOTE — PROGRESS NOTES
Reason for Nutrition Visit:  Pt is a 53 y.o. female being seen at [place]. Referring provider is Beba Richard DO , effective date is 7.11.23 and expiration date is 7.11.24.     1. Type 2 diabetes mellitus without complication, unspecified whether long term insulin use (CMS/[facility])        2. Mixed hyperlipidemia           Past Medical Hx:  Patient Active Problem List   Diagnosis    Essential hypertension    YAZMIN (generalized anxiety disorder)    Mixed hyperlipidemia    GERD (gastroesophageal reflux disease)    Type 2 diabetes mellitus without complication (CMS/HCC)    Abdominal pain    Arthralgia    Dermoid cyst of conjunctiva    Dyslipidemia    Fatigue    Impaired fasting glucose    Other disorders of intestinal carbohydrate absorption    Travelers' diarrhea    Umbilical hernia    Vitamin B12 deficiency    Vitamin D deficiency    Ventral incisional hernia        Current Outpatient Medications:     ALPRAZolam (Xanax) 0.25 mg tablet, Take 1 tablet by mouth as needed for anxiety 30-60 minutes prior to procedure., Disp: 6 tablet, Rfl: 0    CINNAMON BARK ORAL, Take  by mouth., Disp: , Rfl:     ciprofloxacin (Cipro) 500 mg tablet, Take 1 tablet (500 mg) by mouth 2 times a day., Disp: , Rfl:     cyanocobalamin (Vitamin B-12) 1,000 mcg/mL injection, 1 mL once every month. every other week., Disp: , Rfl:     escitalopram (Lexapro) 10 mg tablet, TAKE 1 TABLET BY MOUTH EVERY DAY, Disp: 90 tablet, Rfl: 0    esomeprazole (NexIUM) 40 mg DR capsule, Take 1 capsule (40 mg) by mouth once daily., Disp: , Rfl:     hydroCHLOROthiazide (Microzide) 12.5 mg capsule, Take 1 tablet by mouth once daily., Disp: , Rfl:     L. acidophilus/Bifid. animalis 32 billion cell capsule, Take 1 capsule by mouth once daily., Disp: , Rfl:     Lactobacillus acidophilus (PROBIOTIC ORAL), Take 1 capsule by mouth once daily., Disp: , Rfl:     lancets (OneTouch Delica Plus Lancet) 33 gauge misc, USE AS DIRECTED three times a day, Disp: 100 each, Rfl: 3     "melatonin 3 mg tablet, Take 1 tablet (3 mg) by mouth once daily at bedtime., Disp: , Rfl:     multivitamin tablet, Take 1 tablet by mouth once daily., Disp: , Rfl:     olmesartan (BENIcar) 20 mg tablet, TAKE 1 TABLET BY MOUTH EVERY DAY, Disp: 90 tablet, Rfl: 0    omega 3-dha-epa-fish oil (Fish OiL) 1,000 mg (120 mg-180 mg) capsule, Take by mouth., Disp: , Rfl:     OneTouch Ultra Test strip, Use as directed TID, Disp: 100 strip, Rfl: 5    pantoprazole (ProtoNix) 40 mg EC tablet, Take 1 tablet (40 mg) by mouth once daily. Do not crush, chew, or split., Disp: 90 tablet, Rfl: 1    potassium chloride CR (Klor-Con M20) 20 mEq ER tablet, Take 1 tablet (20 mEq) by mouth once daily., Disp: , Rfl:     raNITIdine (Zantac) 75 mg tablet, Take 1 tablet (75 mg) by mouth twice a day., Disp: , Rfl:     rosuvastatin (Crestor) 20 mg tablet, TAKE 1 TABLET BY MOUTH EVERY DAY, Disp: 90 tablet, Rfl: 0    semaglutide 0.25 mg or 0.5 mg (2 mg/3 mL) pen injector, Inject 0.5 mg under the skin 1 (one) time per week., Disp: 2 mL, Rfl: 1     Anthropometrics:  Anthropometrics  Height: 165.1 cm (5' 5\")  Weight: 82.6 kg (182 lb 1.6 oz)  BMI (Calculated): 30.3   Weight change:    Significant Weight Change: No  Wt at the last appt: 190 lbs.     Lab Results   Component Value Date    HGBA1C 8.9 (A) 06/22/2023    CHOL 127 09/06/2023    CHOL 127 09/06/2023    LDLF - 09/06/2023    TRIG 416 (H) 09/06/2023    HDL 28.9 (A) 09/06/2023    HDL 28.9 (A) 09/06/2023        Chemistry    Lab Results   Component Value Date/Time     06/22/2023 1150    K 4.3 06/22/2023 1150    CL 98 06/22/2023 1150    CO2 26 06/22/2023 1150    BUN 14 06/22/2023 1150    CREATININE 0.52 06/22/2023 1150    Lab Results   Component Value Date/Time    CALCIUM 10.0 06/22/2023 1150    ALKPHOS 70 06/22/2023 1150    AST 23 06/22/2023 1150    ALT 21 06/22/2023 1150    BILITOT 0.8 06/22/2023 1150           Food and Nutrition Hx:  Pt has been using the plate method.   She has been using a " glucometer to test blood glucose 118- 140 mg/dl with average of 130 mg/dl. She was placed on Ozempic.   Pt is waking up until 10:00 am. She is trying to eat meals per day.   24 Diet Recall:  Meal 1: Breakfast is at 10:00 to include protein oats made with 0.5 -1 cup of cooked oats, 1 T of adria seeds, almond milk, and protein powder and 1 T of PB and sometime 0.5 banana (kcal 400- 500 CHO 30- 45)   Meal 2: Lunch skipped or consumed at 2:00 pm to include a salad to include 4 ounces of chicken, 2 cups of vegetables such as cucumber, tomatoes, 1 T of cranberries with low calorie marinade (kcal 300, CHO 20)    Meal 3: Dinner is at 6:00 and she will consume 4 ounces of protein with breading and 2 cups of green beans, or carrots (kcal 300, CHO 25)   Snacks: 1/2 of a protein drink (CHO 1)  Beverages: 60 ounces of water per day. She occasionally drinks Crystal Light.     Allergies: None  Intolerance: None  Appetite: Good  Intake: >75%  GI Symptoms : reflux Frequency: infrequent  Swallowing Difficulty: No problems with swallowing  Dentition : own    Types of Activities: Walking and Gym Membership  Duration: 60 minutes*  3 times a week at the gym at moderate intensity. She exercises a 4th day for 30 minutes walking her dog. Total minutes per week is 210 minutes per week.     Sleep duration/quality : 5-6 hours and disrupted sleep  Sleep disorders: poor sleep hygiene    Supplements: Vitamin D and Fish Oil daily    Feelings of Hunger?: Yes and will eat. Sore belly.   Physical Feeling: Physically full  Binging: Never  Cravings: None  Energy Levels: Stable    Food Preparation: Patient  Cooking Skills/Barriers: None reported  Grocery Shopping: Patient    Nutrition Focused Physical Exam:    Performed/Deferred: Deferred due to be being virtual visit    Other Physical Findings:  Hair: None  None  Mouth: None  Skin: None  Nails: None  none    Malnutrition Present: No    Estimated Energy Needs:    Weight Maintanence: 1,755  kcal/day  Weight Loss Needs: 1,255 kcal/day  Chiqui Brunson (REE x 1.2-1.3) - 500 calories per day for wt loss.     Nutrition Diagnosis:    Diagnosis Statement 1:  Diagnosis Status: Ongoing/Improving   Food- and nutrition-related knowledge deficit related to how to eat for diabetes as current A1c is at 8.9% as evidenced by reports by pt of the need to learn.    Diagnosis Statement 2:  Diagnosis Status: Ongoing/Improving   Excessive carbohydrate intake related to large portion of carbohydrate at some meals  as evidenced by CHO intake at meals can be 70+ grams or more .     Diagnosis Statement 3:   Diagnosis Status: Ongoing/Improving   Physical inactivity related to sedentary lifestyle  as evidenced by reported PAL level of 1.2 .     Nutrition Interventions:  Medical nutrition therapy was given for HLD and diabetes.   Nutrition Counseling: CBT  Coordination of Care: None    Nutrition Goals:  carbohydrate consistency meal plan with aim for 30- 45 grams of carbohydrates at meals and 15 grams at snack to reduce A1c. Total energy intake of 1,255 calories per day for 1 lb wt loss per week.  Heart healthy meal plan with a low saturated fat intake to <5 -6 % of energy (less than 8 grams of saturated fat per day), reduced intake of added sugars (<10% of total energy), 25 grams of fiber with intake of viscous fiber to 5 g/day to 10 g/day, plant sterols/stanols to 2 g/day, 1.1 gram of omega three fatty acids to reduce LDL and TG levels. 1,500 mg sodium restriction per day.     Nutrition Recommendations:  Via teach back method patient verbalized understanding of the following topics:  1) Aim for three meals and 1 snack per day.   2) Strive to be mindful of carbohydrates as the recommendation is to consume ~30- 45 grams of carbohydrates at meal and 15 grams at snacks.   3) Strive to take a complete MVI.     Educational Handouts: CHO counting nutrition guide  Plate Method     Sandra Ortega, MS, RDN, LD, FAITH   Advanced  Practice Clinical Dietitian  [email or web address]  Schedule line [phone]  Direct line [phone]     Readiness to Change : Good  Level of Understanding : Good  Anticipated Compliant : Good

## 2023-11-28 NOTE — PATIENT INSTRUCTIONS
1) Aim for three meals and 1 snack per day.   2) Strive to be mindful of carbohydrates as the recommendation is to consume ~30- 45 grams of carbohydrates at meal and 15 grams at snacks.     Educational Handouts: CHO counting nutrition guide  Plate Method     Sandra Ortega, MS, RDN, LD, FAITH   Advanced Practice Clinical Dietitian  [email or web address]  Schedule line [phone]  Direct line [phone]

## 2023-12-13 ENCOUNTER — OFFICE VISIT (OUTPATIENT)
Dept: SURGERY | Facility: CLINIC | Age: 53
End: 2023-12-13
Payer: COMMERCIAL

## 2023-12-13 VITALS
SYSTOLIC BLOOD PRESSURE: 159 MMHG | WEIGHT: 180 LBS | HEART RATE: 97 BPM | TEMPERATURE: 97.9 F | DIASTOLIC BLOOD PRESSURE: 95 MMHG | HEIGHT: 65 IN | BODY MASS INDEX: 29.99 KG/M2 | OXYGEN SATURATION: 99 %

## 2023-12-13 DIAGNOSIS — K62.89 HYPERTROPHY, ANAL PAPILLAE: ICD-10-CM

## 2023-12-13 DIAGNOSIS — K64.4 SKIN TAG OF ANUS: ICD-10-CM

## 2023-12-13 PROCEDURE — 4010F ACE/ARB THERAPY RXD/TAKEN: CPT | Performed by: COLON & RECTAL SURGERY

## 2023-12-13 PROCEDURE — 1036F TOBACCO NON-USER: CPT | Performed by: COLON & RECTAL SURGERY

## 2023-12-13 PROCEDURE — 3052F HG A1C>EQUAL 8.0%<EQUAL 9.0%: CPT | Performed by: COLON & RECTAL SURGERY

## 2023-12-13 PROCEDURE — 88305 TISSUE EXAM BY PATHOLOGIST: CPT | Mod: TC,SUR | Performed by: COLON & RECTAL SURGERY

## 2023-12-13 PROCEDURE — 99212 OFFICE O/P EST SF 10 MIN: CPT | Performed by: COLON & RECTAL SURGERY

## 2023-12-13 PROCEDURE — 3080F DIAST BP >= 90 MM HG: CPT | Performed by: COLON & RECTAL SURGERY

## 2023-12-13 PROCEDURE — 88305 TISSUE EXAM BY PATHOLOGIST: CPT | Performed by: PATHOLOGY

## 2023-12-13 PROCEDURE — 46611 ANOSCOPY: CPT | Performed by: COLON & RECTAL SURGERY

## 2023-12-13 PROCEDURE — 99202 OFFICE O/P NEW SF 15 MIN: CPT | Performed by: COLON & RECTAL SURGERY

## 2023-12-13 PROCEDURE — 3077F SYST BP >= 140 MM HG: CPT | Performed by: COLON & RECTAL SURGERY

## 2023-12-13 RX ORDER — SILVER NITRATE 38.21; 12.74 MG/1; MG/1
STICK TOPICAL ONCE
Status: DISCONTINUED | OUTPATIENT
Start: 2023-12-13 | End: 2024-01-11 | Stop reason: HOSPADM

## 2023-12-13 RX ORDER — LIDOCAINE HYDROCHLORIDE AND EPINEPHRINE 10; 10 MG/ML; UG/ML
10 INJECTION, SOLUTION INFILTRATION; PERINEURAL ONCE
Status: SHIPPED | OUTPATIENT
Start: 2023-12-13

## 2023-12-13 ASSESSMENT — PAIN SCALES - GENERAL: PAINLEVEL: 0-NO PAIN

## 2023-12-13 ASSESSMENT — ENCOUNTER SYMPTOMS: DEPRESSION: 0

## 2023-12-20 DIAGNOSIS — E11.9 TYPE 2 DIABETES MELLITUS WITHOUT COMPLICATION, WITHOUT LONG-TERM CURRENT USE OF INSULIN (MULTI): ICD-10-CM

## 2023-12-21 ENCOUNTER — TELEPHONE (OUTPATIENT)
Dept: SURGERY | Facility: CLINIC | Age: 53
End: 2023-12-21
Payer: COMMERCIAL

## 2023-12-21 NOTE — TELEPHONE ENCOUNTER
Attempted to reach patient to review surg path from anal tag that was removed.  Message left inviting her to please call the office at [phone].   When she calls back will inform her pathology returned as anal condyloma.  Will need to see her for a recheck in one year.  Michelle Chatman RN

## 2023-12-21 NOTE — TELEPHONE ENCOUNTER
Spoke with patient and advised that her anal tag returned as anal condyloma.  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  Will need to re-examine you in one year.  If you would develop any new lumps or bumps, or have anal discomfort/itching, give us a call for an earlier appointment.  Michelle Chatman RN

## 2023-12-22 RX ORDER — SEMAGLUTIDE 0.68 MG/ML
INJECTION, SOLUTION SUBCUTANEOUS
Qty: 3 ML | Refills: 1 | Status: SHIPPED | OUTPATIENT
Start: 2023-12-22 | End: 2024-03-11

## 2023-12-28 ENCOUNTER — APPOINTMENT (OUTPATIENT)
Dept: PREADMISSION TESTING | Facility: HOSPITAL | Age: 53
End: 2023-12-28
Payer: COMMERCIAL

## 2023-12-28 DIAGNOSIS — Z00.00 ENCOUNTER FOR PREVENTATIVE ADULT HEALTH CARE EXAMINATION: Primary | ICD-10-CM

## 2023-12-28 DIAGNOSIS — E78.2 MODERATE MIXED HYPERLIPIDEMIA NOT REQUIRING STATIN THERAPY: ICD-10-CM

## 2023-12-28 DIAGNOSIS — E11.9 TYPE 2 DIABETES MELLITUS WITHOUT COMPLICATION, UNSPECIFIED WHETHER LONG TERM INSULIN USE (MULTI): ICD-10-CM

## 2023-12-28 DIAGNOSIS — E11.9 DIABETES MELLITUS TYPE 2, NONINSULIN DEPENDENT (MULTI): ICD-10-CM

## 2023-12-28 DIAGNOSIS — E78.5 DYSLIPIDEMIA: ICD-10-CM

## 2023-12-28 DIAGNOSIS — I10 ESSENTIAL HYPERTENSION: ICD-10-CM

## 2023-12-28 RX ORDER — ROSUVASTATIN CALCIUM 20 MG/1
20 TABLET, COATED ORAL DAILY
Qty: 90 TABLET | Refills: 0 | Status: SHIPPED | OUTPATIENT
Start: 2023-12-28 | End: 2024-03-25

## 2023-12-28 RX ORDER — LORATADINE 10 MG/1
10 TABLET ORAL DAILY
COMMUNITY

## 2023-12-28 RX ORDER — VIT C/E/ZN/COPPR/LUTEIN/ZEAXAN 250MG-90MG
25 CAPSULE ORAL DAILY
COMMUNITY

## 2023-12-29 ENCOUNTER — LAB (OUTPATIENT)
Dept: LAB | Facility: LAB | Age: 53
End: 2023-12-29
Payer: COMMERCIAL

## 2023-12-29 DIAGNOSIS — E78.5 DYSLIPIDEMIA: ICD-10-CM

## 2023-12-29 DIAGNOSIS — I10 ESSENTIAL HYPERTENSION: ICD-10-CM

## 2023-12-29 DIAGNOSIS — E11.9 TYPE 2 DIABETES MELLITUS WITHOUT COMPLICATION, UNSPECIFIED WHETHER LONG TERM INSULIN USE (MULTI): ICD-10-CM

## 2023-12-29 LAB
ANION GAP SERPL CALC-SCNC: 12 MMOL/L (ref 10–20)
BUN SERPL-MCNC: 18 MG/DL (ref 6–23)
CALCIUM SERPL-MCNC: 9.6 MG/DL (ref 8.6–10.6)
CHLORIDE SERPL-SCNC: 103 MMOL/L (ref 98–107)
CHOLEST SERPL-MCNC: 109 MG/DL (ref 0–199)
CHOLESTEROL/HDL RATIO: 3.2
CO2 SERPL-SCNC: 29 MMOL/L (ref 21–32)
CREAT SERPL-MCNC: 0.56 MG/DL (ref 0.5–1.05)
EST. AVERAGE GLUCOSE BLD GHB EST-MCNC: 123 MG/DL
GFR SERPL CREATININE-BSD FRML MDRD: >90 ML/MIN/1.73M*2
GLUCOSE SERPL-MCNC: 113 MG/DL (ref 74–99)
HBA1C MFR BLD: 5.9 %
HDLC SERPL-MCNC: 33.7 MG/DL
LDLC SERPL CALC-MCNC: 34 MG/DL
NON HDL CHOLESTEROL: 75 MG/DL (ref 0–149)
POTASSIUM SERPL-SCNC: 4.3 MMOL/L (ref 3.5–5.3)
SODIUM SERPL-SCNC: 140 MMOL/L (ref 136–145)
TRIGL SERPL-MCNC: 208 MG/DL (ref 0–149)
VLDL: 42 MG/DL (ref 0–40)

## 2023-12-29 PROCEDURE — 83036 HEMOGLOBIN GLYCOSYLATED A1C: CPT

## 2023-12-29 PROCEDURE — 36415 COLL VENOUS BLD VENIPUNCTURE: CPT

## 2023-12-29 PROCEDURE — 80061 LIPID PANEL: CPT

## 2023-12-29 PROCEDURE — 80048 BASIC METABOLIC PNL TOTAL CA: CPT

## 2023-12-29 ASSESSMENT — ENCOUNTER SYMPTOMS
WEAKNESS: 0
WEIGHT LOSS: 0
HEADACHES: 0
VISUAL CHANGE: 0
BLACKOUTS: 0
DIZZINESS: 0
BLURRED VISION: 0
SWEATS: 0
FATIGUE: 0
CONFUSION: 0
NERVOUS/ANXIOUS: 0
POLYDIPSIA: 0
HUNGER: 0
POLYPHAGIA: 0
SEIZURES: 0
SPEECH DIFFICULTY: 0
TREMORS: 0

## 2024-01-02 ENCOUNTER — APPOINTMENT (OUTPATIENT)
Dept: PRIMARY CARE | Facility: CLINIC | Age: 54
End: 2024-01-02
Payer: COMMERCIAL

## 2024-01-04 ENCOUNTER — LAB (OUTPATIENT)
Dept: LAB | Facility: LAB | Age: 54
End: 2024-01-04
Payer: COMMERCIAL

## 2024-01-04 ENCOUNTER — PRE-ADMISSION TESTING (OUTPATIENT)
Dept: PREADMISSION TESTING | Facility: HOSPITAL | Age: 54
End: 2024-01-04
Payer: COMMERCIAL

## 2024-01-04 VITALS
HEART RATE: 93 BPM | BODY MASS INDEX: 29.38 KG/M2 | OXYGEN SATURATION: 98 % | DIASTOLIC BLOOD PRESSURE: 89 MMHG | WEIGHT: 176.37 LBS | RESPIRATION RATE: 18 BRPM | TEMPERATURE: 96.3 F | SYSTOLIC BLOOD PRESSURE: 138 MMHG | HEIGHT: 65 IN

## 2024-01-04 DIAGNOSIS — I10 ESSENTIAL HYPERTENSION: Primary | ICD-10-CM

## 2024-01-04 DIAGNOSIS — I10 ESSENTIAL HYPERTENSION: ICD-10-CM

## 2024-01-04 DIAGNOSIS — Z01.818 PREPROCEDURAL EXAMINATION: ICD-10-CM

## 2024-01-04 DIAGNOSIS — E11.9 TYPE 2 DIABETES MELLITUS WITHOUT COMPLICATION, WITHOUT LONG-TERM CURRENT USE OF INSULIN (MULTI): ICD-10-CM

## 2024-01-04 LAB
ALBUMIN SERPL BCP-MCNC: 4.8 G/DL (ref 3.4–5)
ALP SERPL-CCNC: 56 U/L (ref 33–110)
ALT SERPL W P-5'-P-CCNC: 20 U/L (ref 7–45)
ANION GAP SERPL CALC-SCNC: 14 MMOL/L (ref 10–20)
AST SERPL W P-5'-P-CCNC: 17 U/L (ref 9–39)
BASOPHILS # BLD AUTO: 0.04 X10*3/UL (ref 0–0.1)
BASOPHILS NFR BLD AUTO: 0.5 %
BILIRUB SERPL-MCNC: 0.7 MG/DL (ref 0–1.2)
BUN SERPL-MCNC: 23 MG/DL (ref 6–23)
CALCIUM SERPL-MCNC: 9.7 MG/DL (ref 8.6–10.3)
CHLORIDE SERPL-SCNC: 100 MMOL/L (ref 98–107)
CO2 SERPL-SCNC: 26 MMOL/L (ref 21–32)
CREAT SERPL-MCNC: 0.6 MG/DL (ref 0.5–1.05)
EOSINOPHIL # BLD AUTO: 0.06 X10*3/UL (ref 0–0.7)
EOSINOPHIL NFR BLD AUTO: 0.7 %
ERYTHROCYTE [DISTWIDTH] IN BLOOD BY AUTOMATED COUNT: 12.9 % (ref 11.5–14.5)
GFR SERPL CREATININE-BSD FRML MDRD: >90 ML/MIN/1.73M*2
GLUCOSE SERPL-MCNC: 131 MG/DL (ref 74–99)
HCT VFR BLD AUTO: 43 % (ref 36–46)
HGB BLD-MCNC: 14.7 G/DL (ref 12–16)
IMM GRANULOCYTES # BLD AUTO: 0.06 X10*3/UL (ref 0–0.7)
IMM GRANULOCYTES NFR BLD AUTO: 0.7 % (ref 0–0.9)
LYMPHOCYTES # BLD AUTO: 2.82 X10*3/UL (ref 1.2–4.8)
LYMPHOCYTES NFR BLD AUTO: 34.4 %
MCH RBC QN AUTO: 29.9 PG (ref 26–34)
MCHC RBC AUTO-ENTMCNC: 34.2 G/DL (ref 32–36)
MCV RBC AUTO: 87 FL (ref 80–100)
MONOCYTES # BLD AUTO: 0.37 X10*3/UL (ref 0.1–1)
MONOCYTES NFR BLD AUTO: 4.5 %
NEUTROPHILS # BLD AUTO: 4.85 X10*3/UL (ref 1.2–7.7)
NEUTROPHILS NFR BLD AUTO: 59.2 %
NRBC BLD-RTO: 0 /100 WBCS (ref 0–0)
PLATELET # BLD AUTO: 148 X10*3/UL (ref 150–450)
POTASSIUM SERPL-SCNC: 4.3 MMOL/L (ref 3.5–5.3)
PROT SERPL-MCNC: 7.5 G/DL (ref 6.4–8.2)
RBC # BLD AUTO: 4.92 X10*6/UL (ref 4–5.2)
SODIUM SERPL-SCNC: 136 MMOL/L (ref 136–145)
WBC # BLD AUTO: 8.2 X10*3/UL (ref 4.4–11.3)

## 2024-01-04 PROCEDURE — 36415 COLL VENOUS BLD VENIPUNCTURE: CPT

## 2024-01-04 PROCEDURE — 99203 OFFICE O/P NEW LOW 30 MIN: CPT | Performed by: NURSE PRACTITIONER

## 2024-01-04 PROCEDURE — 85025 COMPLETE CBC W/AUTO DIFF WBC: CPT

## 2024-01-04 PROCEDURE — 80053 COMPREHEN METABOLIC PANEL: CPT

## 2024-01-04 PROCEDURE — 87081 CULTURE SCREEN ONLY: CPT | Mod: AHULAB | Performed by: NURSE PRACTITIONER

## 2024-01-04 RX ORDER — CHLORHEXIDINE GLUCONATE ORAL RINSE 1.2 MG/ML
15 SOLUTION DENTAL DAILY
Qty: 30 ML | Refills: 0 | Status: SHIPPED | OUTPATIENT
Start: 2024-01-04 | End: 2024-01-11 | Stop reason: HOSPADM

## 2024-01-04 ASSESSMENT — ENCOUNTER SYMPTOMS
CARDIOVASCULAR NEGATIVE: 1
GASTROINTESTINAL NEGATIVE: 1
NECK NEGATIVE: 1
RHINORRHEA: 1
CONSTITUTIONAL NEGATIVE: 1
RESPIRATORY NEGATIVE: 1
MUSCULOSKELETAL NEGATIVE: 1
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
## 2024-01-04 NOTE — PREPROCEDURE INSTRUCTIONS
Medication List            Accurate as of January 4, 2024  7:54 AM. Always use your most recent med list.                ALPRAZolam 0.25 mg tablet  Commonly known as: Xanax  Take 1 tablet by mouth as needed for anxiety 30-60 minutes prior to procedure.  Medication Adjustments for Surgery: Take morning of surgery with sip of water, no other fluids     chlorhexidine 0.12 % solution  Commonly known as: Peridex  Use 15 mL in the mouth or throat once daily for 2 days.  Notes to patient: Use as instructed      CINNAMON BARK ORAL  Medication Adjustments for Surgery: Stop 7 days before surgery     escitalopram 10 mg tablet  Commonly known as: Lexapro  TAKE 1 TABLET BY MOUTH EVERY DAY  Medication Adjustments for Surgery: Take morning of surgery with sip of water, no other fluids     Fish OiL 1,000 mg (120 mg-180 mg) capsule  Generic drug: omega 3-dha-epa-fish oil  Medication Adjustments for Surgery: Stop 7 days before surgery     lancets 33 gauge misc  Commonly known as: OneTouch Delica Plus Lancet  USE AS DIRECTED three times a day  Notes to patient: Use as instructed      loratadine 10 mg tablet  Commonly known as: Claritin  Medication Adjustments for Surgery: Take morning of surgery with sip of water, no other fluids     melatonin 3 mg tablet  Medication Adjustments for Surgery: Take morning of surgery with sip of water, no other fluids     multivitamin tablet  Medication Adjustments for Surgery: Stop 7 days before surgery     olmesartan 20 mg tablet  Commonly known as: BENIcar  TAKE 1 TABLET BY MOUTH EVERY DAY  Medication Adjustments for Surgery: Continue until night before surgery     OneTouch Ultra Test strip  Generic drug: blood sugar diagnostic  Use as directed TID  Notes to patient: Use as instructed      Ozempic 0.25 mg or 0.5 mg (2 mg/3 mL) pen injector  Generic drug: semaglutide  INJECT 0.5 MG UNDER THE SKIN 1 (ONE) TIME PER WEEK.  Medication Adjustments for Surgery: Stop 7 days before surgery     pantoprazole  40 mg EC tablet  Commonly known as: ProtoNix  Take 1 tablet (40 mg) by mouth once daily. Do not crush, chew, or split.  Medication Adjustments for Surgery: Take morning of surgery with sip of water, no other fluids     rosuvastatin 20 mg tablet  Commonly known as: Crestor  TAKE 1 TABLET BY MOUTH EVERY DAY  Medication Adjustments for Surgery: Take morning of surgery with sip of water, no other fluids     Vitamin D3 25 MCG (1000 UT) capsule  Generic drug: cholecalciferol  Medication Adjustments for Surgery: Continue until night before surgery                          CONTACT SURGEON'S OFFICE IF YOU DEVELOP:  * Fever = 100.4 F   * New respiratory symptoms (e.g. cough, shortness of breath, respiratory distress, sore throat)  * Recent loss of taste or smell  *Flu like symptoms such as headache, fatigue or gastrointestinal symptoms  * You develop any open sores, shingles, burning or painful urination   AND/OR:  * You no longer wish to have the surgery.  * Any other personal circumstances change that may lead to the need to cancel or defer this surgery.  *You were admitted to any hospital within one week of your planned procedure.    SMOKING:  *Quitting smoking can make a huge difference to your health and recovery from surgery.    *If you need help with quitting, call 3-951-QUIT-NOW.    THE DAY BEFORE SURGERY:  *Do not eat any food after midnight the night before surgery.   *You are permitted to drink clear liquids (i.e. water, black coffee, tea, clear broth, apple juice) up to 2 hours before your surgery.  DIABETICS:  Please check fasting blood sugar  upon waking up.  If fasting sugar is <80 mg/dl, please drink 100ml/3oz of apple juice no later than 2 hours prior to surgery.      SURGICAL TIME  *You will be contacted between 2 p.m. and 6 p.m. the business day before your surgery with your arrival time.  *If you haven't received a call by 6pm, call [phone].  *Scheduled surgery times may change and you will be  notified if this occurs-check your personal voicemail for any updates.    ON THE MORNING OF SURGERY:  *Wear comfortable, loose fitting clothing.   *Do not use moisturizers, creams, lotions or perfume.  *All jewelry and valuables should be left at home.  *Prosthetic devices such as contact lenses, hearing aids, dentures, eyelash extensions, hairpins and body piercing must be removed before surgery.    BRING WITH YOU:  *Photo ID and insurance card  *Current list of medicines and allergies  *Pacemaker/Defibrillator/Heart stent cards  *CPAP machine and mask  *Slings/splints/crutches  *Copy of your complete Advanced Directive/DHPOA-if applicable  *Neurostimulator implant remote    PARKING AND ARRIVAL:  *Check in at the Main Entrance desk and let them know you are here for surgery.  *You will be directed to the 2nd floor surgical waiting area.    AFTER OUTPATIENT SURGERY:  *A responsible adult MUST accompany you at the time of discharge and stay with you for 24 hours after your surgery.  *You may NOT drive yourself home after surgery.  *You may use a taxi or ride sharing service ([employer], [employer]) to return home ONLY if you are accompanied by a friend or family member.  *Instructions for resuming your medications will be provided by your surgeon.    YOU HAVE REVIEWED THE MEDICATIONS ON THIS SHEET AND YOU VERIFY THESE ARE ALL THE MEDICATIONS AND OVER THE COUNTER MEDICATIONS THAT YOU TAKE .
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
## 2024-01-06 LAB — STAPHYLOCOCCUS SPEC CULT: NORMAL

## 2024-01-11 ENCOUNTER — ANESTHESIA (OUTPATIENT)
Dept: OPERATING ROOM | Facility: HOSPITAL | Age: 54
End: 2024-01-11
Payer: COMMERCIAL

## 2024-01-11 ENCOUNTER — ANESTHESIA EVENT (OUTPATIENT)
Dept: OPERATING ROOM | Facility: HOSPITAL | Age: 54
End: 2024-01-11
Payer: COMMERCIAL

## 2024-01-11 ENCOUNTER — HOSPITAL ENCOUNTER (OUTPATIENT)
Facility: HOSPITAL | Age: 54
Setting detail: OUTPATIENT SURGERY
Discharge: HOME | End: 2024-01-11
Attending: SURGERY | Admitting: SURGERY
Payer: COMMERCIAL

## 2024-01-11 VITALS
HEART RATE: 71 BPM | WEIGHT: 179.23 LBS | OXYGEN SATURATION: 95 % | TEMPERATURE: 97.7 F | SYSTOLIC BLOOD PRESSURE: 130 MMHG | HEIGHT: 65 IN | RESPIRATION RATE: 16 BRPM | BODY MASS INDEX: 29.86 KG/M2 | DIASTOLIC BLOOD PRESSURE: 85 MMHG

## 2024-01-11 DIAGNOSIS — K43.2 VENTRAL INCISIONAL HERNIA: Primary | ICD-10-CM

## 2024-01-11 LAB — GLUCOSE BLD MANUAL STRIP-MCNC: 129 MG/DL (ref 74–99)

## 2024-01-11 PROCEDURE — A49594 PR RPR AA HERNIA 1ST 3-10 CM NCRC8/STRANGULATED: Performed by: ANESTHESIOLOGY

## 2024-01-11 PROCEDURE — A4217 STERILE WATER/SALINE, 500 ML: HCPCS | Performed by: SURGERY

## 2024-01-11 PROCEDURE — 2500000004 HC RX 250 GENERAL PHARMACY W/ HCPCS (ALT 636 FOR OP/ED): Performed by: SURGERY

## 2024-01-11 PROCEDURE — 3700000002 HC GENERAL ANESTHESIA TIME - EACH INCREMENTAL 1 MINUTE: Performed by: SURGERY

## 2024-01-11 PROCEDURE — 3600000004 HC OR TIME - INITIAL BASE CHARGE - PROCEDURE LEVEL FOUR: Performed by: SURGERY

## 2024-01-11 PROCEDURE — 3700000001 HC GENERAL ANESTHESIA TIME - INITIAL BASE CHARGE: Performed by: SURGERY

## 2024-01-11 PROCEDURE — 82947 ASSAY GLUCOSE BLOOD QUANT: CPT

## 2024-01-11 PROCEDURE — C1781 MESH (IMPLANTABLE): HCPCS | Performed by: SURGERY

## 2024-01-11 PROCEDURE — 2720000007 HC OR 272 NO HCPCS: Performed by: SURGERY

## 2024-01-11 PROCEDURE — 7100000001 HC RECOVERY ROOM TIME - INITIAL BASE CHARGE: Performed by: SURGERY

## 2024-01-11 PROCEDURE — 2500000004 HC RX 250 GENERAL PHARMACY W/ HCPCS (ALT 636 FOR OP/ED): Performed by: NURSE ANESTHETIST, CERTIFIED REGISTERED

## 2024-01-11 PROCEDURE — 7100000002 HC RECOVERY ROOM TIME - EACH INCREMENTAL 1 MINUTE: Performed by: SURGERY

## 2024-01-11 PROCEDURE — 3600000009 HC OR TIME - EACH INCREMENTAL 1 MINUTE - PROCEDURE LEVEL FOUR: Performed by: SURGERY

## 2024-01-11 PROCEDURE — 2500000005 HC RX 250 GENERAL PHARMACY W/O HCPCS: Performed by: SURGERY

## 2024-01-11 PROCEDURE — 49594 RPR AA HRN 1ST 3-10 NCR/STRN: CPT | Performed by: SURGERY

## 2024-01-11 PROCEDURE — 7100000009 HC PHASE TWO TIME - INITIAL BASE CHARGE: Performed by: SURGERY

## 2024-01-11 PROCEDURE — A49594 PR RPR AA HERNIA 1ST 3-10 CM NCRC8/STRANGULATED: Performed by: NURSE ANESTHETIST, CERTIFIED REGISTERED

## 2024-01-11 PROCEDURE — 2780000003 HC OR 278 NO HCPCS: Performed by: SURGERY

## 2024-01-11 PROCEDURE — 2500000001 HC RX 250 WO HCPCS SELF ADMINISTERED DRUGS (ALT 637 FOR MEDICARE OP): Performed by: ANESTHESIOLOGY

## 2024-01-11 PROCEDURE — 7100000010 HC PHASE TWO TIME - EACH INCREMENTAL 1 MINUTE: Performed by: SURGERY

## 2024-01-11 PROCEDURE — 2500000005 HC RX 250 GENERAL PHARMACY W/O HCPCS: Performed by: NURSE ANESTHETIST, CERTIFIED REGISTERED

## 2024-01-11 DEVICE — MESH, PROGRIP LAP, 10 X 15 CM, FLATSHEET: Type: IMPLANTABLE DEVICE | Site: ABDOMEN | Status: FUNCTIONAL

## 2024-01-11 RX ORDER — SODIUM CHLORIDE, SODIUM LACTATE, POTASSIUM CHLORIDE, CALCIUM CHLORIDE 600; 310; 30; 20 MG/100ML; MG/100ML; MG/100ML; MG/100ML
100 INJECTION, SOLUTION INTRAVENOUS CONTINUOUS
Status: DISCONTINUED | OUTPATIENT
Start: 2024-01-11 | End: 2024-01-11 | Stop reason: HOSPADM

## 2024-01-11 RX ORDER — SODIUM CHLORIDE, SODIUM LACTATE, POTASSIUM CHLORIDE, CALCIUM CHLORIDE 600; 310; 30; 20 MG/100ML; MG/100ML; MG/100ML; MG/100ML
50 INJECTION, SOLUTION INTRAVENOUS CONTINUOUS
Status: DISCONTINUED | OUTPATIENT
Start: 2024-01-11 | End: 2024-01-11 | Stop reason: HOSPADM

## 2024-01-11 RX ORDER — LIDOCAINE HYDROCHLORIDE 10 MG/ML
0.1 INJECTION, SOLUTION EPIDURAL; INFILTRATION; INTRACAUDAL; PERINEURAL ONCE
Status: DISCONTINUED | OUTPATIENT
Start: 2024-01-11 | End: 2024-01-11 | Stop reason: HOSPADM

## 2024-01-11 RX ORDER — ACETAMINOPHEN 325 MG/1
650 TABLET ORAL EVERY 4 HOURS PRN
Status: DISCONTINUED | OUTPATIENT
Start: 2024-01-11 | End: 2024-01-11 | Stop reason: HOSPADM

## 2024-01-11 RX ORDER — OXYCODONE HYDROCHLORIDE 5 MG/1
5 TABLET ORAL EVERY 6 HOURS PRN
Qty: 12 TABLET | Refills: 0 | Status: SHIPPED | OUTPATIENT
Start: 2024-01-11 | End: 2024-02-22 | Stop reason: WASHOUT

## 2024-01-11 RX ORDER — OXYCODONE HYDROCHLORIDE 5 MG/1
5 TABLET ORAL EVERY 4 HOURS PRN
Status: DISCONTINUED | OUTPATIENT
Start: 2024-01-11 | End: 2024-01-11 | Stop reason: HOSPADM

## 2024-01-11 RX ORDER — ROCURONIUM BROMIDE 10 MG/ML
INJECTION, SOLUTION INTRAVENOUS AS NEEDED
Status: DISCONTINUED | OUTPATIENT
Start: 2024-01-11 | End: 2024-01-11

## 2024-01-11 RX ORDER — PROPOFOL 10 MG/ML
INJECTION, EMULSION INTRAVENOUS AS NEEDED
Status: DISCONTINUED | OUTPATIENT
Start: 2024-01-11 | End: 2024-01-11

## 2024-01-11 RX ORDER — DEXAMETHASONE SODIUM PHOSPHATE 4 MG/ML
INJECTION, SOLUTION INTRA-ARTICULAR; INTRALESIONAL; INTRAMUSCULAR; INTRAVENOUS; SOFT TISSUE AS NEEDED
Status: DISCONTINUED | OUTPATIENT
Start: 2024-01-11 | End: 2024-01-11

## 2024-01-11 RX ORDER — SODIUM CHLORIDE 0.9 G/100ML
IRRIGANT IRRIGATION AS NEEDED
Status: DISCONTINUED | OUTPATIENT
Start: 2024-01-11 | End: 2024-01-11 | Stop reason: HOSPADM

## 2024-01-11 RX ORDER — MIDAZOLAM HYDROCHLORIDE 1 MG/ML
INJECTION, SOLUTION INTRAMUSCULAR; INTRAVENOUS AS NEEDED
Status: DISCONTINUED | OUTPATIENT
Start: 2024-01-11 | End: 2024-01-11

## 2024-01-11 RX ORDER — LIDOCAINE HYDROCHLORIDE 20 MG/ML
INJECTION, SOLUTION EPIDURAL; INFILTRATION; INTRACAUDAL; PERINEURAL AS NEEDED
Status: DISCONTINUED | OUTPATIENT
Start: 2024-01-11 | End: 2024-01-11

## 2024-01-11 RX ORDER — BUPIVACAINE HYDROCHLORIDE 5 MG/ML
INJECTION, SOLUTION PERINEURAL AS NEEDED
Status: DISCONTINUED | OUTPATIENT
Start: 2024-01-11 | End: 2024-01-11 | Stop reason: HOSPADM

## 2024-01-11 RX ORDER — POLYETHYLENE GLYCOL 3350 17 G/17G
17 POWDER, FOR SOLUTION ORAL DAILY PRN
Qty: 10 PACKET | Refills: 0 | Status: SHIPPED | OUTPATIENT
Start: 2024-01-11 | End: 2024-02-22 | Stop reason: WASHOUT

## 2024-01-11 RX ORDER — FENTANYL CITRATE 50 UG/ML
INJECTION, SOLUTION INTRAMUSCULAR; INTRAVENOUS AS NEEDED
Status: DISCONTINUED | OUTPATIENT
Start: 2024-01-11 | End: 2024-01-11

## 2024-01-11 RX ORDER — ONDANSETRON HYDROCHLORIDE 2 MG/ML
4 INJECTION, SOLUTION INTRAVENOUS ONCE AS NEEDED
Status: DISCONTINUED | OUTPATIENT
Start: 2024-01-11 | End: 2024-01-11 | Stop reason: HOSPADM

## 2024-01-11 RX ORDER — SODIUM CHLORIDE, SODIUM LACTATE, POTASSIUM CHLORIDE, CALCIUM CHLORIDE 600; 310; 30; 20 MG/100ML; MG/100ML; MG/100ML; MG/100ML
INJECTION, SOLUTION INTRAVENOUS CONTINUOUS PRN
Status: DISCONTINUED | OUTPATIENT
Start: 2024-01-11 | End: 2024-01-11

## 2024-01-11 RX ORDER — ONDANSETRON HYDROCHLORIDE 2 MG/ML
INJECTION, SOLUTION INTRAVENOUS AS NEEDED
Status: DISCONTINUED | OUTPATIENT
Start: 2024-01-11 | End: 2024-01-11

## 2024-01-11 RX ORDER — IBUPROFEN 600 MG/1
600 TABLET ORAL EVERY 6 HOURS PRN
Qty: 20 TABLET | Refills: 0 | Status: SHIPPED | OUTPATIENT
Start: 2024-01-11 | End: 2024-04-09 | Stop reason: WASHOUT

## 2024-01-11 RX ADMIN — SODIUM CHLORIDE, POTASSIUM CHLORIDE, SODIUM LACTATE AND CALCIUM CHLORIDE: 600; 310; 30; 20 INJECTION, SOLUTION INTRAVENOUS at 10:47

## 2024-01-11 RX ADMIN — ROCURONIUM BROMIDE 50 MG: 10 INJECTION, SOLUTION INTRAVENOUS at 10:52

## 2024-01-11 RX ADMIN — ROCURONIUM BROMIDE 10 MG: 10 INJECTION, SOLUTION INTRAVENOUS at 11:35

## 2024-01-11 RX ADMIN — MIDAZOLAM HYDROCHLORIDE 2 MG: 1 INJECTION, SOLUTION INTRAMUSCULAR; INTRAVENOUS at 10:50

## 2024-01-11 RX ADMIN — DEXAMETHASONE SODIUM PHOSPHATE 4 MG: 4 INJECTION, SOLUTION INTRA-ARTICULAR; INTRALESIONAL; INTRAMUSCULAR; INTRAVENOUS; SOFT TISSUE at 12:03

## 2024-01-11 RX ADMIN — PROPOFOL 150 MG: 10 INJECTION, EMULSION INTRAVENOUS at 10:52

## 2024-01-11 RX ADMIN — FENTANYL CITRATE 100 MCG: 50 INJECTION, SOLUTION INTRAMUSCULAR; INTRAVENOUS at 10:57

## 2024-01-11 RX ADMIN — FENTANYL CITRATE 50 MCG: 50 INJECTION, SOLUTION INTRAMUSCULAR; INTRAVENOUS at 10:50

## 2024-01-11 RX ADMIN — LIDOCAINE HYDROCHLORIDE 100 MG: 20 INJECTION, SOLUTION EPIDURAL; INFILTRATION; INTRACAUDAL; PERINEURAL at 10:52

## 2024-01-11 RX ADMIN — OXYCODONE HYDROCHLORIDE 5 MG: 5 TABLET ORAL at 13:09

## 2024-01-11 RX ADMIN — ONDANSETRON 4 MG: 2 INJECTION, SOLUTION INTRAMUSCULAR; INTRAVENOUS at 12:03

## 2024-01-11 RX ADMIN — VANCOMYCIN HYDROCHLORIDE 1500 MG: 1.5 INJECTION, POWDER, LYOPHILIZED, FOR SOLUTION INTRAVENOUS at 10:47

## 2024-01-11 RX ADMIN — SUGAMMADEX 200 MG: 100 INJECTION, SOLUTION INTRAVENOUS at 12:28

## 2024-01-11 RX ADMIN — MIDAZOLAM HYDROCHLORIDE 2 MG: 1 INJECTION, SOLUTION INTRAMUSCULAR; INTRAVENOUS at 10:44

## 2024-01-11 RX ADMIN — PROPOFOL 50 MG: 10 INJECTION, EMULSION INTRAVENOUS at 11:15

## 2024-01-11 RX ADMIN — FENTANYL CITRATE 50 MCG: 50 INJECTION, SOLUTION INTRAMUSCULAR; INTRAVENOUS at 10:52

## 2024-01-11 ASSESSMENT — COLUMBIA-SUICIDE SEVERITY RATING SCALE - C-SSRS
6. HAVE YOU EVER DONE ANYTHING, STARTED TO DO ANYTHING, OR PREPARED TO DO ANYTHING TO END YOUR LIFE?: NO
2. HAVE YOU ACTUALLY HAD ANY THOUGHTS OF KILLING YOURSELF?: NO
1. IN THE PAST MONTH, HAVE YOU WISHED YOU WERE DEAD OR WISHED YOU COULD GO TO SLEEP AND NOT WAKE UP?: NO

## 2024-01-11 ASSESSMENT — PAIN SCALES - GENERAL
PAIN_LEVEL: 3
PAINLEVEL_OUTOF10: 4
PAINLEVEL_OUTOF10: 4
PAINLEVEL_OUTOF10: 0 - NO PAIN
PAINLEVEL_OUTOF10: 3
PAINLEVEL_OUTOF10: 4
PAINLEVEL_OUTOF10: 0 - NO PAIN
PAINLEVEL_OUTOF10: 3
PAINLEVEL_OUTOF10: 4

## 2024-01-11 ASSESSMENT — PAIN DESCRIPTION - ORIENTATION: ORIENTATION: LEFT

## 2024-01-11 ASSESSMENT — PAIN - FUNCTIONAL ASSESSMENT
PAIN_FUNCTIONAL_ASSESSMENT: 0-10

## 2024-01-11 ASSESSMENT — PAIN DESCRIPTION - LOCATION: LOCATION: ABDOMEN

## 2024-01-11 NOTE — ANESTHESIA PROCEDURE NOTES
Airway  Date/Time: 1/11/2024 10:54 AM  Urgency: elective    Airway not difficult    Staffing  Performed: CRNA   Authorized by: Rudolph Acevedo MD    Performed by: ERROL Lawrence-FABIAN  Patient location during procedure: OR    Indications and Patient Condition  Indications for airway management: anesthesia  Spontaneous Ventilation: absent  Sedation level: deep  Preoxygenated: yes  Patient position: sniffing  Mask difficulty assessment: 1 - vent by mask    Final Airway Details  Final airway type: endotracheal airway      Successful airway: ETT  Cuffed: yes   Successful intubation technique: video laryngoscopy  Facilitating devices/methods: intubating stylet and OPA  Endotracheal tube insertion site: oral  Blade: Luz  Blade size: #3  ETT size (mm): 7.0  Cormack-Lehane Classification: grade I - full view of glottis  Placement verified by: chest auscultation and capnometry   Measured from: lips  ETT to lips (cm): 22  Number of attempts at approach: 1

## 2024-01-11 NOTE — OP NOTE
Robot Assisted Incisional Hernia Repair; Mesh Placement Operative Note     Date: 2024  OR Location: [facility] A OR    Name: Emperatriz Carter, : 1970, Age: 53 y.o., MRN: [id number], Sex: female    Diagnosis  Pre-op Diagnosis     * Ventral incisional hernia [K43.2] Post-op Diagnosis     * Ventral incisional hernia [K43.2]     Procedures  Robot Assisted Incisional Hernia Repair; Mesh Placement  [id number] - GA RPR AA HERNIA 1ST 3-10 CM [id number]/STRANGULATED      Surgeons      * Sina Otto - Primary    Resident/Fellow/Other Assistant:  Surgeon(s) and Role:pgy 4    Procedure Summary  Anesthesia: General  ASA: III  Anesthesia Staff: Anesthesiologist: Rudolph Acevedo MD; Deny Campbell MD  CRNA: ERROL Lawrence-CRNA; ERROL Ocasio-CRNA  Estimated Blood Loss: 10mL  Intra-op Medications:   Medication Name Total Dose   BUPivacaine HCl (Marcaine) 0.5 % (5 mg/mL) injection 16 mL   sodium chloride 0.9 % irrigation solution 1,000 mL   vancomycin (Vancocin) in dextrose 5 % water (D5W) 500 mL IV 1,500 mg 1,500 mg              Anesthesia Record               Intraprocedure I/O Totals          Intake    LR infusion 600.00 mL    Total Intake 600 mL       Output    Est. Blood Loss 10 mL    Total Output 10 mL       Net    Net Volume 590 mL          Specimen: No specimens collected     Staff:   Circulator: Deana Engel RN  Relief Circulator: Delilah Hart RN  Relief Scrub: Eileen Mckeon  Scrub Person: Jayme Scott         Drains and/or Catheters:   [REMOVED] NG/OG/Feeding Tube (Removed)       Tourniquet Times:         Implants:  Implants       Type Name Action Serial No.      Surgical Mesh Sling Implant MESH, PROGRIP LAP, 10 X 15 CM, FLATSHEET - [id number] Implanted               Findings: Chronically incarcerated periumbilical incisional hernias.  Length of hernia 5.5 cm    Indications: Emperatriz Carter is an 53 y.o. female who is having surgery for Ventral incisional hernia [K43.2].     The patient was  seen in the preoperative area. The risks, benefits, complications, treatment options, non-operative alternatives, expected recovery and outcomes were discussed with the patient. The possibilities of reaction to medication, pulmonary aspiration, injury to surrounding structures, bleeding, recurrent infection, the need for additional procedures, failure to diagnose a condition, and creating a complication requiring transfusion or operation were discussed with the patient. The patient concurred with the proposed plan, giving informed consent.  The site of surgery was properly noted/marked if necessary per policy. The patient has been actively warmed in preoperative area. Preoperative antibiotics have been ordered and given within 1 hours of incision. Venous thrombosis prophylaxis have been ordered including bilateral sequential compression devices    Procedure Details: Patient consented for operative correction of a chronically incarcerated periumbilical incisional hernia.  Risks and benefits were detailed informed consent was obtained.    Brought the OR placed supine.  Timeout was performed confirm patient procedure.  Antibiotics were given general anesthesia ministered through endotracheal tube.  Left arm was tucked and we prepped and draped sterilely.  Injected Marcaine made subxiphoid left of midline incision with scalpel.  Fascia was incised and entered peritoneal cavity balloon port and insufflated.  Robotic 30 degree camera was introduced.  I placed 3 left lateral 8 mm ports.  We then docked the robot and I went to the console.  Omentum was reduced from this periumbilical defect easily.  I then developed the peritoneal flap starting at the lateral edge of the left rectus muscle and worked my way across the midline to the lateral edge of the right rectus muscle.  There were actually 2 separate defects with total length of hernia defect measuring out 5.5 cm.  Once I had developed my preperitoneal space  sufficiently I closed the midline fascia to reestablish the linea alba with a running #1 V-Loc suture.  I then introduced the 10 x 15 ProGrip mesh with the self affixing side placed against the abdominal wall.  Fixation of the abdominal wall was then done using interrupted 3-0 Vicryl sutures from mesh to muscle and fascia.  Once this was done we closed our peritoneal flap with a running 3 0 V-Loc absorbable suture.  Couple of small peritoneal rents were closed with a 3-0 Vicryl.  All the needles were accounted for and removed.  We then undocked the robot and removed our ports under direct visualization.  We closed the subxiphoid fascia with 0 Vicryl suture.  Skin incisions were closed with 4-0 Monocryl and Dermabond patient recovery room with a binder in satisfactory condition    Complications:  None; patient tolerated the procedure well.    Disposition: PACU - hemodynamically stable.  Condition: stable         Additional Details:     Attending Attestation: I was present for the entire procedure.    Sina Otto  Phone Number: [phone]

## 2024-01-11 NOTE — ANESTHESIA POSTPROCEDURE EVALUATION
Patient: Emperatriz Carter    Procedure Summary       Date: 01/11/24 Room / Location: [facility] OR 08 / Virtual [facility] OR    Anesthesia Start: 1047 Anesthesia Stop: 1255    Procedure: Robot Assisted Incisional Hernia Repair; Mesh Placement (Abdomen) Diagnosis:       Ventral incisional hernia      (Ventral incisional hernia [K43.2])    Surgeons: Sina Otto MD Responsible Provider: ROOSEVELT Lawrence    Anesthesia Type: general ASA Status: 3            Anesthesia Type: general    Vitals Value Taken Time   /85 01/11/24 1401   Temp 36.7 °C (98.1 °F) 01/11/24 1245   Pulse 70 01/11/24 1401   Resp 16 01/11/24 1345   SpO2 96 % 01/11/24 1401   Vitals shown include unvalidated device data.    Anesthesia Post Evaluation    Patient location during evaluation: PACU  Patient participation: complete - patient participated  Level of consciousness: awake and alert  Pain score: 3  Pain management: adequate  Airway patency: patent  Cardiovascular status: acceptable  Respiratory status: acceptable  Hydration status: acceptable  Postoperative Nausea and Vomiting: none        No notable events documented.
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
## 2024-01-12 ASSESSMENT — PAIN SCALES - GENERAL: PAINLEVEL_OUTOF10: 4

## 2024-01-25 ENCOUNTER — OFFICE VISIT (OUTPATIENT)
Dept: SURGERY | Facility: CLINIC | Age: 54
End: 2024-01-25
Payer: COMMERCIAL

## 2024-01-25 VITALS
TEMPERATURE: 97.3 F | HEIGHT: 65 IN | WEIGHT: 180.4 LBS | HEART RATE: 92 BPM | SYSTOLIC BLOOD PRESSURE: 133 MMHG | BODY MASS INDEX: 30.06 KG/M2 | DIASTOLIC BLOOD PRESSURE: 84 MMHG

## 2024-01-25 DIAGNOSIS — K43.6 INCARCERATED VENTRAL HERNIA: Primary | ICD-10-CM

## 2024-01-25 PROCEDURE — 99213 OFFICE O/P EST LOW 20 MIN: CPT | Performed by: SURGERY

## 2024-01-25 PROCEDURE — 1036F TOBACCO NON-USER: CPT | Performed by: SURGERY

## 2024-01-25 PROCEDURE — 3075F SYST BP GE 130 - 139MM HG: CPT | Performed by: SURGERY

## 2024-01-25 PROCEDURE — 3079F DIAST BP 80-89 MM HG: CPT | Performed by: SURGERY

## 2024-01-25 PROCEDURE — 4010F ACE/ARB THERAPY RXD/TAKEN: CPT | Performed by: SURGERY

## 2024-01-25 ASSESSMENT — PAIN SCALES - GENERAL: PAINLEVEL: 0-NO PAIN

## 2024-01-25 NOTE — PROGRESS NOTES
Assessment/Plan   Excellent recovery so far following recent robotic assisted repair of an incarcerated ventral hernia.  We reviewed the timeline for when she can resume unrestricted activities.  Follow-up with me in 6 to 8 weeks for final assessment    Isidoro Awan is following up for her 2-week postop visit following recent robotic assisted repair of an incarcerated ventral hernia.  This week she is nearly symptom-free.  No pain.  Happy with the way things are progressing.       Objective     Physical Exam  NAD  A&Ox3  Non icteric  CTA  RR  Abdomen soft min tender. Wounds clean, intact.  No clinical evidence of recurrent hernia  Extremities warm, well perfused         Relevant Results      No results found for this or any previous visit (from the past 24 hour(s)).        I spent 25 minutes in the professional and overall care of this patient.      Sina Otto MD

## 2024-01-25 NOTE — LETTER
January 25, 2024     Beba Richard DO  [id number] [facility] [id number]    Patient: Emperatriz Carter   YOB: 1970   Date of Visit: 1/25/2024       Dear Dr. Beba Richard DO:    Thank you for referring Emperatriz Carter to me for evaluation. Below are my notes for this consultation.  If you have questions, please do not hesitate to call me. I look forward to following your patient along with you.       Sincerely,     Sina Otto MD      CC: No Recipients  ______________________________________________________________________________________    Assessment/Plan  Excellent recovery so far following recent robotic assisted repair of an incarcerated ventral hernia.  We reviewed the timeline for when she can resume unrestricted activities.  Follow-up with me in 6 to 8 weeks for final assessment    Subjective  [place] is following up for her 2-week postop visit following recent robotic assisted repair of an incarcerated ventral hernia.  This week she is nearly symptom-free.  No pain.  Happy with the way things are progressing.       Objective    Physical Exam  NAD  A&Ox3  Non icteric  CTA  RR  Abdomen soft min tender. Wounds clean, intact.  No clinical evidence of recurrent hernia  Extremities warm, well perfused         Relevant Results      No results found for this or any previous visit (from the past 24 hour(s)).        I spent 25 minutes in the professional and overall care of this patient.      Sina Otto MD

## 2024-02-05 DIAGNOSIS — K21.9 GASTROESOPHAGEAL REFLUX DISEASE, UNSPECIFIED WHETHER ESOPHAGITIS PRESENT: ICD-10-CM

## 2024-02-05 DIAGNOSIS — E11.9 TYPE 2 DIABETES MELLITUS WITHOUT COMPLICATION, WITHOUT LONG-TERM CURRENT USE OF INSULIN (MULTI): ICD-10-CM

## 2024-02-05 RX ORDER — BLOOD SUGAR DIAGNOSTIC
STRIP MISCELLANEOUS
Qty: 100 STRIP | Refills: 1 | Status: SHIPPED | OUTPATIENT
Start: 2024-02-05 | End: 2024-04-08

## 2024-02-05 RX ORDER — PANTOPRAZOLE SODIUM 40 MG/1
40 TABLET, DELAYED RELEASE ORAL DAILY
Qty: 90 TABLET | Refills: 1 | Status: SHIPPED | OUTPATIENT
Start: 2024-02-05

## 2024-02-10 DIAGNOSIS — I10 ESSENTIAL HYPERTENSION: ICD-10-CM

## 2024-02-10 DIAGNOSIS — F41.1 GAD (GENERALIZED ANXIETY DISORDER): ICD-10-CM

## 2024-02-12 ENCOUNTER — DOCUMENTATION (OUTPATIENT)
Dept: SURGERY | Facility: HOSPITAL | Age: 54
End: 2024-02-12
Payer: COMMERCIAL

## 2024-02-12 RX ORDER — ESCITALOPRAM OXALATE 10 MG/1
10 TABLET ORAL DAILY
Qty: 90 TABLET | Refills: 1 | Status: SHIPPED | OUTPATIENT
Start: 2024-02-12

## 2024-02-12 RX ORDER — OLMESARTAN MEDOXOMIL 20 MG/1
20 TABLET ORAL DAILY
Qty: 90 TABLET | Refills: 1 | Status: SHIPPED | OUTPATIENT
Start: 2024-02-12 | End: 2024-02-22 | Stop reason: DRUGHIGH

## 2024-02-21 ASSESSMENT — ENCOUNTER SYMPTOMS
FATIGUE: 0
NERVOUS/ANXIOUS: 0
HUNGER: 0
POLYPHAGIA: 0
POLYDIPSIA: 0
CONFUSION: 0
DIZZINESS: 0
SPEECH DIFFICULTY: 0
WEIGHT LOSS: 0
SEIZURES: 0
HEADACHES: 0
TREMORS: 0
WEAKNESS: 0
BLACKOUTS: 0
VISUAL CHANGE: 0
SWEATS: 0
BLURRED VISION: 0

## 2024-02-22 ENCOUNTER — OFFICE VISIT (OUTPATIENT)
Dept: PRIMARY CARE | Facility: CLINIC | Age: 54
End: 2024-02-22
Payer: COMMERCIAL

## 2024-02-22 ENCOUNTER — OFFICE VISIT (OUTPATIENT)
Dept: SURGERY | Facility: CLINIC | Age: 54
End: 2024-02-22
Payer: COMMERCIAL

## 2024-02-22 VITALS
HEART RATE: 84 BPM | OXYGEN SATURATION: 96 % | DIASTOLIC BLOOD PRESSURE: 86 MMHG | TEMPERATURE: 97.4 F | SYSTOLIC BLOOD PRESSURE: 135 MMHG | WEIGHT: 186 LBS | BODY MASS INDEX: 30.95 KG/M2

## 2024-02-22 VITALS — DIASTOLIC BLOOD PRESSURE: 87 MMHG | SYSTOLIC BLOOD PRESSURE: 130 MMHG | TEMPERATURE: 97 F | HEART RATE: 76 BPM

## 2024-02-22 DIAGNOSIS — Z23 IMMUNIZATION DUE: ICD-10-CM

## 2024-02-22 DIAGNOSIS — L90.6 STRIAE: ICD-10-CM

## 2024-02-22 DIAGNOSIS — F41.1 GAD (GENERALIZED ANXIETY DISORDER): ICD-10-CM

## 2024-02-22 DIAGNOSIS — K43.2 VENTRAL INCISIONAL HERNIA: Primary | ICD-10-CM

## 2024-02-22 DIAGNOSIS — I10 ESSENTIAL HYPERTENSION: ICD-10-CM

## 2024-02-22 DIAGNOSIS — E11.9 TYPE 2 DIABETES MELLITUS WITHOUT COMPLICATION, WITHOUT LONG-TERM CURRENT USE OF INSULIN (MULTI): ICD-10-CM

## 2024-02-22 DIAGNOSIS — E78.5 DYSLIPIDEMIA: ICD-10-CM

## 2024-02-22 DIAGNOSIS — K21.9 GASTROESOPHAGEAL REFLUX DISEASE, UNSPECIFIED WHETHER ESOPHAGITIS PRESENT: ICD-10-CM

## 2024-02-22 DIAGNOSIS — E78.2 MIXED HYPERLIPIDEMIA: ICD-10-CM

## 2024-02-22 DIAGNOSIS — F41.0 PANIC: Primary | ICD-10-CM

## 2024-02-22 PROBLEM — R10.9 ABDOMINAL PAIN: Status: RESOLVED | Noted: 2023-09-14 | Resolved: 2024-02-22

## 2024-02-22 PROBLEM — A09 TRAVELERS' DIARRHEA: Status: RESOLVED | Noted: 2023-09-14 | Resolved: 2024-02-22

## 2024-02-22 PROCEDURE — 3075F SYST BP GE 130 - 139MM HG: CPT | Performed by: INTERNAL MEDICINE

## 2024-02-22 PROCEDURE — 3079F DIAST BP 80-89 MM HG: CPT | Performed by: SURGERY

## 2024-02-22 PROCEDURE — 3079F DIAST BP 80-89 MM HG: CPT | Performed by: INTERNAL MEDICINE

## 2024-02-22 PROCEDURE — 90739 HEPB VACC 2/4 DOSE ADULT IM: CPT | Performed by: INTERNAL MEDICINE

## 2024-02-22 PROCEDURE — 99213 OFFICE O/P EST LOW 20 MIN: CPT | Performed by: SURGERY

## 2024-02-22 PROCEDURE — 1036F TOBACCO NON-USER: CPT | Performed by: SURGERY

## 2024-02-22 PROCEDURE — 4010F ACE/ARB THERAPY RXD/TAKEN: CPT | Performed by: INTERNAL MEDICINE

## 2024-02-22 PROCEDURE — 90471 IMMUNIZATION ADMIN: CPT | Performed by: INTERNAL MEDICINE

## 2024-02-22 PROCEDURE — 1036F TOBACCO NON-USER: CPT | Performed by: INTERNAL MEDICINE

## 2024-02-22 PROCEDURE — 4010F ACE/ARB THERAPY RXD/TAKEN: CPT | Performed by: SURGERY

## 2024-02-22 PROCEDURE — 3075F SYST BP GE 130 - 139MM HG: CPT | Performed by: SURGERY

## 2024-02-22 PROCEDURE — 99214 OFFICE O/P EST MOD 30 MIN: CPT | Performed by: INTERNAL MEDICINE

## 2024-02-22 RX ORDER — OLMESARTAN MEDOXOMIL 40 MG/1
40 TABLET ORAL DAILY
Qty: 90 TABLET | Refills: 1 | Status: SHIPPED | OUTPATIENT
Start: 2024-02-22 | End: 2024-05-23

## 2024-02-22 RX ORDER — DEXAMETHASONE 1 MG/1
TABLET ORAL
Qty: 1 TABLET | Refills: 0 | Status: SHIPPED | OUTPATIENT
Start: 2024-02-22 | End: 2024-04-09 | Stop reason: WASHOUT

## 2024-02-22 RX ORDER — ALPRAZOLAM 0.25 MG/1
0.25 TABLET ORAL 3 TIMES DAILY PRN
Qty: 6 TABLET | Refills: 0 | Status: SHIPPED | OUTPATIENT
Start: 2024-02-22 | End: 2024-10-19

## 2024-02-22 ASSESSMENT — PATIENT HEALTH QUESTIONNAIRE - PHQ9
1. LITTLE INTEREST OR PLEASURE IN DOING THINGS: NOT AT ALL
SUM OF ALL RESPONSES TO PHQ9 QUESTIONS 1 AND 2: 0
2. FEELING DOWN, DEPRESSED OR HOPELESS: NOT AT ALL

## 2024-02-22 ASSESSMENT — ENCOUNTER SYMPTOMS: DEPRESSION: 0

## 2024-02-22 ASSESSMENT — PAIN SCALES - GENERAL
PAINLEVEL: 0-NO PAIN
PAINLEVEL: 0-NO PAIN

## 2024-02-22 NOTE — PATIENT INSTRUCTIONS
[place],   Blood pressure - increase dose of olmesartan to 40mg. Continue to periodically measure home blood pressure readings.   ?excess cortisol   Take 1mg of dexamethasone between 11pm and 12am. The next morning, come in to get a blood test is ordered at 8am   For gas - try simethicone (gasx), and increase fiber   Followup in 3 months for preventive care visit
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
## 2024-02-23 NOTE — ASSESSMENT & PLAN NOTE
With significant improvement in A1C on ozempic, A1C down to 5.9%!   Eager to resume lifestyle modifications post procedure, hold off on escalation in dose.

## 2024-02-27 ENCOUNTER — NUTRITION (OUTPATIENT)
Dept: NUTRITION | Facility: CLINIC | Age: 54
End: 2024-02-27
Payer: COMMERCIAL

## 2024-02-27 VITALS — WEIGHT: 188.27 LBS | HEIGHT: 65 IN | BODY MASS INDEX: 31.37 KG/M2

## 2024-02-27 DIAGNOSIS — E78.2 MIXED HYPERLIPIDEMIA: ICD-10-CM

## 2024-02-27 DIAGNOSIS — E11.9 TYPE 2 DIABETES MELLITUS WITHOUT COMPLICATION, UNSPECIFIED WHETHER LONG TERM INSULIN USE (MULTI): Primary | ICD-10-CM

## 2024-02-27 PROCEDURE — 97803 MED NUTRITION INDIV SUBSEQ: CPT | Performed by: DIETITIAN, REGISTERED

## 2024-02-27 NOTE — PROGRESS NOTES
Reason for Nutrition Visit:  Pt is a 54 y.o. female being seen at [facility]. Referring provider is Beba Richard DO , effective date is 7.11.23 and expiration date is 7.11.24.     1. Type 2 diabetes mellitus without complication, unspecified whether long term insulin use (CMS/[facility])        2. Mixed hyperlipidemia              Past Medical Hx:  Patient Active Problem List   Diagnosis    Essential hypertension    YAZMIN (generalized anxiety disorder)    Mixed hyperlipidemia    GERD (gastroesophageal reflux disease)    Type 2 diabetes mellitus without complication (CMS/HCC)    Arthralgia    Dermoid cyst of conjunctiva    Fatigue    Impaired fasting glucose    Other disorders of intestinal carbohydrate absorption    Umbilical hernia    Vitamin B12 deficiency    Vitamin D deficiency    Ventral incisional hernia        Current Outpatient Medications:     ALPRAZolam (Xanax) 0.25 mg tablet, Take 1 tablet (0.25 mg) by mouth 3 times a day as needed for anxiety., Disp: 6 tablet, Rfl: 0    cholecalciferol (Vitamin D3) 25 MCG (1000 UT) capsule, Take 1 capsule (25 mcg) by mouth once daily., Disp: , Rfl:     CINNAMON BARK ORAL, Take 2 capsules by mouth once daily., Disp: , Rfl:     dexAMETHasone (Decadron) 1 mg tablet, Take 1 tablet by mouth between 11pm and 12am the night before the test., Disp: 1 tablet, Rfl: 0    escitalopram (Lexapro) 10 mg tablet, TAKE 1 TABLET BY MOUTH EVERY DAY, Disp: 90 tablet, Rfl: 1    ibuprofen 600 mg tablet, Take 1 tablet (600 mg) by mouth every 6 hours if needed for moderate pain (4 - 6) for up to 20 doses., Disp: 20 tablet, Rfl: 0    lancets (OneTouch Delica Plus Lancet) 33 gauge misc, USE AS DIRECTED three times a day, Disp: 100 each, Rfl: 3    loratadine (Claritin) 10 mg tablet, Take 1 tablet (10 mg) by mouth once daily., Disp: , Rfl:     melatonin 3 mg tablet, Take 1 tablet (3 mg) by mouth as needed at bedtime for sleep., Disp: , Rfl:     multivitamin tablet, Take 1 tablet by mouth once daily., Disp: ,  "Rfl:     olmesartan (BENIcar) 40 mg tablet, Take 1 tablet (40 mg) by mouth once daily., Disp: 90 tablet, Rfl: 1    omega 3-dha-epa-fish oil (Fish OiL) 1,000 mg (120 mg-180 mg) capsule, Take 2 capsules (2,000 mg) by mouth 2 times a day., Disp: , Rfl:     OneTouch Ultra Test strip, USE AS DIRECTED 3 TIMES A DAY, Disp: 100 strip, Rfl: 1    pantoprazole (ProtoNix) 40 mg EC tablet, TAKE 1 TABLET (40 MG) BY MOUTH DAILY DO NOT CRUSH, CHEW, ORSPLIT, Disp: 90 tablet, Rfl: 1    rosuvastatin (Crestor) 20 mg tablet, TAKE 1 TABLET BY MOUTH EVERY DAY, Disp: 90 tablet, Rfl: 0    semaglutide (Ozempic) 0.25 mg or 0.5 mg (2 mg/3 mL) pen injector, INJECT 0.5 MG UNDER THE SKIN 1 (ONE) TIME PER WEEK. (Patient taking differently: Inject 0.5 mg under the skin every 7 days. Friday), Disp: 3 mL, Rfl: 1    Current Facility-Administered Medications:     lidocaine-epinephrine (Xylocaine W/EPI) 1 %-1:100,000 injection 10 mL, 10 mL, injection, Once, Opal Corbin MD     Anthropometrics:  Anthropometrics  Height: 165.1 cm (5' 5\")  Weight: 85.4 kg (188 lb 4.4 oz)  BMI (Calculated): 31.33   Weight change:    Significant Weight Change: No  02/27/24 Weight:188 lbs 4.4 ounces.   11/2023 Weight 182 lbs 1.6 ounces  09/2023 Weight 190 lbs.     Lab Results   Component Value Date    HGBA1C 5.9 (H) 12/29/2023    CHOL 109 12/29/2023    LDLF - 09/06/2023    TRIG 208 (H) 12/29/2023    HDL 33.7 12/29/2023        Chemistry    Lab Results   Component Value Date/Time     01/04/2024 0837    K 4.3 01/04/2024 0837     01/04/2024 0837    CO2 26 01/04/2024 0837    BUN 23 01/04/2024 0837    CREATININE 0.60 01/04/2024 0837    Lab Results   Component Value Date/Time    CALCIUM 9.7 01/04/2024 0837    ALKPHOS 56 01/04/2024 0837    AST 17 01/04/2024 0837    ALT 20 01/04/2024 0837    BILITOT 0.7 01/04/2024 0837           Food and Nutrition Hx:  Pt has been using the plate method. She has not really been CHO counting.   She has been using a glucometer to test " blood glucose about 140 mg/dl when before to was 118- 140 mg/dl. She was placed on Ozempic. A1c was 8.9% and now it is at 5.9%. TG was at 416 to 208 mg/dl.   Pt has hernia surgery in January.   She reports she has been belching   Pt is waking up until 10:00 am. She is trying to eat meals per day.     24 Diet Recall:  Meal 1: Breakfast is at 10:00 to include protein oats made with 0.5 -1 cup of cooked protein oats, 1 T of adria seeds, almond milk, and protein powder and 1 T of flaxseed and sometime 0.5 banana or prune or berries(kcal 400- 500 CHO 30- 45)   Meal 2: Lunch skipped or consumed at 2:00 pm to include a salad to include 3- 4 ounces of chicken, 2 cups of vegetables such as cucumber, tomatoes, 1 T of cranberries with low calorie marinade (kcal 300, CHO 20)    Meal 3: Dinner is at 6:00 and she will consume 4 ounces of protein with breading, 2 cups of green beans, or carrots  or broccoli and 1 cup of pasta with oil or pasta sauce (kcal 300, CHO 25)   Snacks: 1/2 of a protein drink (CHO 1). She has been consuming the bedtime snacks.   Beverages: 60 ounces of water per day. She occasionally drinks Crystal Light.     Allergies: None  Intolerance: None  Appetite: Good  Intake: >75%  GI Symptoms : reflux Frequency: infrequent  Swallowing Difficulty: No problems with swallowing  Dentition : own    Types of Activities: Walking and Gym Membership  Duration: 60 minutes*  4 times a week at the gym at moderate intensity. 3 days a week for 60 minutes of resistance training and 1 days of walking for 60 minutes.  Total minutes per week is 240 minutes per week.     Sleep duration/quality : 5-6 hours and disrupted sleep. Pt has been sleeping better.   Sleep disorders: poor sleep hygiene    Supplements: Vitamin D and Fish Oil daily. She is not taking a complete MVI.     Feelings of Hunger?: Yes and will eat. Sore belly.   Physical Feeling: Physically full  Binging: Never  Cravings: None  Energy Levels: Stable    Food  Preparation: Patient  Cooking Skills/Barriers: None reported  Grocery Shopping: Patient    Nutrition Focused Physical Exam:    Performed/Deferred: Deferred due to be being virtual visit    Nutrition Focused Physical Exam:  Physical Findings (Nutrition Deficiency/Toxicity)  Hair: Negative  Eyes: Negative  Mouth: Negative  Nails: Positive (central ridges)  Skin: Negative     Malnutrition Present: No    Estimated Energy Needs:    Weight Maintanence: 1,755 kcal/day  Weight Loss Needs: 1,255 kcal/day  [place] (REE x 1.2-1.3) - 500 calories per day for wt loss.     Nutrition Diagnosis:    Diagnosis Statement 1:  Diagnosis Status: Ongoing/Improving   Food- and nutrition-related knowledge deficit related to how to eat for diabetes as current A1c is at 8.9% as evidenced by reports by pt of the need to learn.    Diagnosis Statement 2:  Diagnosis: Improving   Excessive carbohydrate intake related to large portion of carbohydrate at some meals  as evidenced by CHO intake at meals can be 70+ grams or more .     Diagnosis Statement 3:   Diagnosis: Improving.   Physical inactivity related to sedentary lifestyle  as evidenced by reported PAL level of 1.2 .     Nutrition Interventions:  Medical nutrition therapy was given for HLD and diabetes.   Nutrition Counseling: CBT  Coordination of Care: None    Nutrition Goals:  carbohydrate consistency meal plan with aim for 30- 45 grams of carbohydrates at meals and 15 grams at snack to reduce A1c. Total energy intake of 1,255 calories per day for 1 lb wt loss per week.  Heart healthy meal plan with a low saturated fat intake to <5 -6 % of energy (less than 8 grams of saturated fat per day), reduced intake of added sugars (<10% of total energy), 25 grams of fiber with intake of viscous fiber to 5 g/day to 10 g/day, plant sterols/stanols to 2 g/day, 1.1 gram of omega three fatty acids to reduce LDL and TG levels. 1,500 mg sodium restriction per day.     Nutrition  Recommendations:  Via teach back method patient verbalized understanding of the following topics:  1) Aim for three meals and 1 snack per day. Always consume a bedtime snack to include 15 grams of carbohydrates plus protein.   2) Switch from tablespoon to teaspoons with when adding added fats such as seeds to meals.   3) Consider adding a little protein to the afternoon snack.   4) Strive to work up to 300- 400 minutes of exercise per week as this the recommendation for exercise for wt loss. Consider working up to 3 days a week of cardio for 60 minutes and 3 days a week for resistance training for 60 minutes.     Educational Handouts: CHO counting nutrition guide  Plate Method   Next session: Mindful eating Meditation     Sandra Ortega, MS, RDN, LD, FAITH   Advanced Practice Clinical Dietitian  [email or web address]  Schedule line [phone]  Direct line [phone]     Readiness to Change : Good  Level of Understanding : Good  Anticipated Compliant : Good

## 2024-02-27 NOTE — PATIENT INSTRUCTIONS
1) Aim for three meals and 1 snack per day. Always consume a bedtime snack to include 15 grams of carbohydrates plus protein.   2) Switch from tablespoon to teaspoons with when adding added fats such as seeds to meals.   3) Consider adding a little protein to the afternoon snack.   4) Strive to work up to 300- 400 minutes of exercise per week as this the recommendation for exercise for wt loss. Consider working up to 3 days a week of cardio for 60 minutes and 3 days a week for resistance training for 60 minutes.     Educational Handouts: CHO counting nutrition guide    Sandra Ortega, MS, RDN, LD, FAITH   Advanced Practice Clinical Dietitian  [email or web address]  Schedule line [phone]  Direct line [phone]

## 2024-02-29 ENCOUNTER — LAB (OUTPATIENT)
Dept: LAB | Facility: LAB | Age: 54
End: 2024-02-29
Payer: COMMERCIAL

## 2024-02-29 DIAGNOSIS — L90.6 STRIAE: ICD-10-CM

## 2024-02-29 LAB — CORTIS SERPL-MCNC: 0.8 UG/DL (ref 2.5–20)

## 2024-02-29 PROCEDURE — 82533 TOTAL CORTISOL: CPT

## 2024-02-29 PROCEDURE — 80375 DRUG/SUBSTANCE NOS 1-3: CPT

## 2024-02-29 PROCEDURE — 36415 COLL VENOUS BLD VENIPUNCTURE: CPT

## 2024-03-05 LAB — DEXAMETHASONE SERPL-MCNC: 372.2 NG/DL

## 2024-03-10 DIAGNOSIS — E11.9 TYPE 2 DIABETES MELLITUS WITHOUT COMPLICATION, WITHOUT LONG-TERM CURRENT USE OF INSULIN (MULTI): ICD-10-CM

## 2024-03-11 RX ORDER — SEMAGLUTIDE 0.68 MG/ML
0.5 INJECTION, SOLUTION SUBCUTANEOUS
Qty: 3 ML | Refills: 2 | Status: SHIPPED | OUTPATIENT
Start: 2024-03-11 | End: 2024-04-19 | Stop reason: DRUGHIGH

## 2024-03-22 ENCOUNTER — CLINICAL SUPPORT (OUTPATIENT)
Dept: PRIMARY CARE | Facility: CLINIC | Age: 54
End: 2024-03-22
Payer: COMMERCIAL

## 2024-03-22 DIAGNOSIS — Z23 ENCOUNTER FOR IMMUNIZATION: ICD-10-CM

## 2024-03-22 PROCEDURE — 90471 IMMUNIZATION ADMIN: CPT | Performed by: INTERNAL MEDICINE

## 2024-03-22 PROCEDURE — 90739 HEPB VACC 2/4 DOSE ADULT IM: CPT | Performed by: INTERNAL MEDICINE

## 2024-03-25 DIAGNOSIS — E78.2 MODERATE MIXED HYPERLIPIDEMIA NOT REQUIRING STATIN THERAPY: ICD-10-CM

## 2024-03-25 DIAGNOSIS — E11.9 DIABETES MELLITUS TYPE 2, NONINSULIN DEPENDENT (MULTI): ICD-10-CM

## 2024-03-25 RX ORDER — ROSUVASTATIN CALCIUM 20 MG/1
20 TABLET, COATED ORAL DAILY
Qty: 90 TABLET | Refills: 3 | Status: SHIPPED | OUTPATIENT
Start: 2024-03-25

## 2024-04-05 DIAGNOSIS — N95.0 PMB (POSTMENOPAUSAL BLEEDING): Primary | ICD-10-CM

## 2024-04-06 DIAGNOSIS — E11.9 TYPE 2 DIABETES MELLITUS WITHOUT COMPLICATION, WITHOUT LONG-TERM CURRENT USE OF INSULIN (MULTI): ICD-10-CM

## 2024-04-08 ENCOUNTER — HOSPITAL ENCOUNTER (OUTPATIENT)
Dept: RADIOLOGY | Facility: CLINIC | Age: 54
Discharge: HOME | End: 2024-04-08
Payer: COMMERCIAL

## 2024-04-08 DIAGNOSIS — R92.8 ABNORMAL FINDINGS ON DIAGNOSTIC IMAGING OF BREAST: ICD-10-CM

## 2024-04-08 PROCEDURE — 76642 ULTRASOUND BREAST LIMITED: CPT | Mod: RIGHT SIDE | Performed by: STUDENT IN AN ORGANIZED HEALTH CARE EDUCATION/TRAINING PROGRAM

## 2024-04-08 PROCEDURE — 76642 ULTRASOUND BREAST LIMITED: CPT | Mod: RT

## 2024-04-08 PROCEDURE — 76982 USE 1ST TARGET LESION: CPT | Mod: RT

## 2024-04-08 RX ORDER — BLOOD SUGAR DIAGNOSTIC
STRIP MISCELLANEOUS
Qty: 100 STRIP | Refills: 1 | Status: SHIPPED | OUTPATIENT
Start: 2024-04-08

## 2024-04-09 ENCOUNTER — OFFICE VISIT (OUTPATIENT)
Dept: OBSTETRICS AND GYNECOLOGY | Facility: CLINIC | Age: 54
End: 2024-04-09
Payer: COMMERCIAL

## 2024-04-09 VITALS
HEIGHT: 65 IN | WEIGHT: 188 LBS | BODY MASS INDEX: 31.32 KG/M2 | SYSTOLIC BLOOD PRESSURE: 148 MMHG | DIASTOLIC BLOOD PRESSURE: 94 MMHG

## 2024-04-09 DIAGNOSIS — Z01.419 ENCOUNTER FOR GYNECOLOGICAL EXAMINATION WITHOUT ABNORMAL FINDING: Primary | ICD-10-CM

## 2024-04-09 PROCEDURE — 3080F DIAST BP >= 90 MM HG: CPT | Performed by: OBSTETRICS & GYNECOLOGY

## 2024-04-09 PROCEDURE — 87624 HPV HI-RISK TYP POOLED RSLT: CPT

## 2024-04-09 PROCEDURE — 88175 CYTOPATH C/V AUTO FLUID REDO: CPT

## 2024-04-09 PROCEDURE — 99386 PREV VISIT NEW AGE 40-64: CPT | Performed by: OBSTETRICS & GYNECOLOGY

## 2024-04-09 PROCEDURE — 4010F ACE/ARB THERAPY RXD/TAKEN: CPT | Performed by: OBSTETRICS & GYNECOLOGY

## 2024-04-09 PROCEDURE — 3077F SYST BP >= 140 MM HG: CPT | Performed by: OBSTETRICS & GYNECOLOGY
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
## 2024-04-15 ENCOUNTER — HOSPITAL ENCOUNTER (OUTPATIENT)
Dept: RADIOLOGY | Facility: HOSPITAL | Age: 54
Discharge: HOME | End: 2024-04-15
Payer: COMMERCIAL

## 2024-04-15 DIAGNOSIS — N95.0 PMB (POSTMENOPAUSAL BLEEDING): ICD-10-CM

## 2024-04-15 PROCEDURE — 76856 US EXAM PELVIC COMPLETE: CPT

## 2024-04-16 DIAGNOSIS — N94.89 ADNEXAL MASS: Primary | ICD-10-CM

## 2024-04-16 NOTE — RESULT ENCOUNTER NOTE
"The pelvic ultrasound does note that the endometrium is thickened at 1.3 cm.  Normal is typically 5 to 6 mm or thinner.  The \"cystic structure\" within the endometrium does make me think that an endometrial polyp is present.  Before proceeding with an endometrial biopsy or D&C, I do recommend a pelvic MRI.  There is a structure adjacent to the left ovary measuring 1.8 cm.  The MRI could be helpful at looking at all of these areas more closely.  The MRI was ordered, you can schedule this through MyChart.  "

## 2024-04-19 DIAGNOSIS — E11.9 TYPE 2 DIABETES MELLITUS WITHOUT COMPLICATION, UNSPECIFIED WHETHER LONG TERM INSULIN USE (MULTI): Primary | ICD-10-CM

## 2024-04-30 ENCOUNTER — APPOINTMENT (OUTPATIENT)
Dept: RADIOLOGY | Facility: HOSPITAL | Age: 54
End: 2024-04-30
Payer: COMMERCIAL

## 2024-05-02 ENCOUNTER — OFFICE VISIT (OUTPATIENT)
Dept: OBSTETRICS AND GYNECOLOGY | Facility: CLINIC | Age: 54
End: 2024-05-02
Payer: COMMERCIAL

## 2024-05-02 VITALS — WEIGHT: 186 LBS | DIASTOLIC BLOOD PRESSURE: 90 MMHG | SYSTOLIC BLOOD PRESSURE: 160 MMHG | BODY MASS INDEX: 30.95 KG/M2

## 2024-05-02 DIAGNOSIS — R93.89 THICKENED ENDOMETRIUM: ICD-10-CM

## 2024-05-02 DIAGNOSIS — N95.0 PMB (POSTMENOPAUSAL BLEEDING): Primary | ICD-10-CM

## 2024-05-02 PROCEDURE — 3080F DIAST BP >= 90 MM HG: CPT | Performed by: OBSTETRICS & GYNECOLOGY

## 2024-05-02 PROCEDURE — 99214 OFFICE O/P EST MOD 30 MIN: CPT | Performed by: OBSTETRICS & GYNECOLOGY

## 2024-05-02 PROCEDURE — 4010F ACE/ARB THERAPY RXD/TAKEN: CPT | Performed by: OBSTETRICS & GYNECOLOGY

## 2024-05-02 PROCEDURE — 3077F SYST BP >= 140 MM HG: CPT | Performed by: OBSTETRICS & GYNECOLOGY

## 2024-05-03 ENCOUNTER — PREP FOR PROCEDURE (OUTPATIENT)
Dept: OBSTETRICS AND GYNECOLOGY | Facility: CLINIC | Age: 54
End: 2024-05-03
Payer: COMMERCIAL

## 2024-05-03 DIAGNOSIS — R93.89 THICKENED ENDOMETRIUM: ICD-10-CM

## 2024-05-03 DIAGNOSIS — N95.0 PMB (POSTMENOPAUSAL BLEEDING): Primary | ICD-10-CM
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
## 2024-05-06 ENCOUNTER — LAB (OUTPATIENT)
Dept: LAB | Facility: LAB | Age: 54
End: 2024-05-06
Payer: COMMERCIAL

## 2024-05-06 DIAGNOSIS — R93.89 THICKENED ENDOMETRIUM: ICD-10-CM

## 2024-05-06 DIAGNOSIS — N95.0 PMB (POSTMENOPAUSAL BLEEDING): ICD-10-CM

## 2024-05-06 PROCEDURE — 80048 BASIC METABOLIC PNL TOTAL CA: CPT

## 2024-05-06 PROCEDURE — 36415 COLL VENOUS BLD VENIPUNCTURE: CPT

## 2024-05-06 PROCEDURE — 85027 COMPLETE CBC AUTOMATED: CPT

## 2024-05-07 LAB
ANION GAP SERPL CALC-SCNC: 18 MMOL/L (ref 10–20)
BUN SERPL-MCNC: 20 MG/DL (ref 6–23)
CALCIUM SERPL-MCNC: 10.2 MG/DL (ref 8.6–10.6)
CHLORIDE SERPL-SCNC: 102 MMOL/L (ref 98–107)
CO2 SERPL-SCNC: 27 MMOL/L (ref 21–32)
CREAT SERPL-MCNC: 0.69 MG/DL (ref 0.5–1.05)
EGFRCR SERPLBLD CKD-EPI 2021: >90 ML/MIN/1.73M*2
ERYTHROCYTE [DISTWIDTH] IN BLOOD BY AUTOMATED COUNT: 12.7 % (ref 11.5–14.5)
GLUCOSE SERPL-MCNC: 132 MG/DL (ref 74–99)
HCT VFR BLD AUTO: 46.9 % (ref 36–46)
HGB BLD-MCNC: 16.2 G/DL (ref 12–16)
MCH RBC QN AUTO: 30.7 PG (ref 26–34)
MCHC RBC AUTO-ENTMCNC: 34.5 G/DL (ref 32–36)
MCV RBC AUTO: 89 FL (ref 80–100)
NRBC BLD-RTO: 0 /100 WBCS (ref 0–0)
PLATELET # BLD AUTO: 185 X10*3/UL (ref 150–450)
POTASSIUM SERPL-SCNC: 4.5 MMOL/L (ref 3.5–5.3)
RBC # BLD AUTO: 5.27 X10*6/UL (ref 4–5.2)
SODIUM SERPL-SCNC: 142 MMOL/L (ref 136–145)
WBC # BLD AUTO: 7.8 X10*3/UL (ref 4.4–11.3)

## 2024-05-20 ENCOUNTER — PREP FOR PROCEDURE (OUTPATIENT)
Dept: OBSTETRICS AND GYNECOLOGY | Facility: CLINIC | Age: 54
End: 2024-05-20
Payer: COMMERCIAL

## 2024-05-20 DIAGNOSIS — N95.0 PMB (POSTMENOPAUSAL BLEEDING): Primary | ICD-10-CM

## 2024-05-22 PROBLEM — N95.0 PMB (POSTMENOPAUSAL BLEEDING): Status: ACTIVE | Noted: 2024-05-20

## 2024-05-23 DIAGNOSIS — I10 ESSENTIAL HYPERTENSION: ICD-10-CM

## 2024-05-23 RX ORDER — OLMESARTAN MEDOXOMIL 40 MG/1
40 TABLET ORAL DAILY
Qty: 90 TABLET | Refills: 1 | Status: SHIPPED | OUTPATIENT
Start: 2024-05-23

## 2024-05-28 ENCOUNTER — NUTRITION (OUTPATIENT)
Dept: NUTRITION | Facility: CLINIC | Age: 54
End: 2024-05-28
Payer: COMMERCIAL

## 2024-05-28 VITALS — WEIGHT: 189.15 LBS | BODY MASS INDEX: 31.52 KG/M2 | HEIGHT: 65 IN

## 2024-05-28 DIAGNOSIS — E78.2 MIXED HYPERLIPIDEMIA: ICD-10-CM

## 2024-05-28 DIAGNOSIS — E11.9 TYPE 2 DIABETES MELLITUS WITHOUT COMPLICATION, UNSPECIFIED WHETHER LONG TERM INSULIN USE (MULTI): Primary | ICD-10-CM

## 2024-05-28 PROCEDURE — 97803 MED NUTRITION INDIV SUBSEQ: CPT | Performed by: DIETITIAN, REGISTERED

## 2024-05-28 NOTE — PATIENT INSTRUCTIONS
1) Aim for the bedtime snack to see if this snack can be reduce blood glucose the next morning more to 130 mg/dl. The snack should be 15 grams of carbohydrates plus protein.   2) Strive to perform 3 deep reset breaths prior to eating. The rate of breathing can influence the pace of eating so by slowing down breathe, eating pace may slow. Performing the 3 reset breathes can also separate thoughts and worries of the day from eating at meal time.   3) A general mindfulness meditation was given today. Setting aside some time for general mindfulness meditation can help us become centered and more mindful by focusing on breathe. Attached is the general mindfulness meditation. Set a regular time to meditate with aim for 6 days a week could help increase mindfulness related to eating.    Educational Handouts: General Mindfulness Meditation of the breath   CHO counting nutrition guide, Plate Method   Next session: hunger meditation and scale     Sandra Ortega, MS, RDN, [place], FAITH   Advanced Practice Clinical Dietitian  [email or web address]  Schedule line [phone]  Direct line [phone]

## 2024-05-28 NOTE — PROGRESS NOTES
Reason for Nutrition Visit:  Pt is a 54 y.o. female being seen at [facility]. Referring provider is Beba Richard DO , effective date is 7.11.23 and expiration date is 7.11.24.     1. Type 2 diabetes mellitus without complication, unspecified whether long term insulin use (Multi)        2. Mixed hyperlipidemia              Past Medical Hx:  Patient Active Problem List   Diagnosis    Essential hypertension    YAZMIN (generalized anxiety disorder)    Mixed hyperlipidemia    GERD (gastroesophageal reflux disease)    Type 2 diabetes mellitus without complication (Multi)    Arthralgia    Dermoid cyst of conjunctiva    Fatigue    Impaired fasting glucose    Other disorders of intestinal carbohydrate absorption    Umbilical hernia    Vitamin B12 deficiency    Vitamin D deficiency    Ventral incisional hernia    PMB (postmenopausal bleeding)        Current Outpatient Medications:     ALPRAZolam (Xanax) 0.25 mg tablet, Take 1 tablet (0.25 mg) by mouth 3 times a day as needed for anxiety., Disp: 6 tablet, Rfl: 0    cholecalciferol (Vitamin D3) 25 MCG (1000 UT) capsule, Take 1 capsule (25 mcg) by mouth once daily., Disp: , Rfl:     CINNAMON BARK ORAL, Take 2 capsules by mouth once daily., Disp: , Rfl:     escitalopram (Lexapro) 10 mg tablet, TAKE 1 TABLET BY MOUTH EVERY DAY, Disp: 90 tablet, Rfl: 1    lancets (OneTouch Delica Plus Lancet) 33 gauge misc, USE AS DIRECTED three times a day, Disp: 100 each, Rfl: 3    loratadine (Claritin) 10 mg tablet, Take 1 tablet (10 mg) by mouth once daily., Disp: , Rfl:     melatonin 3 mg tablet, Take 1 tablet (3 mg) by mouth as needed at bedtime for sleep., Disp: , Rfl:     multivitamin tablet, Take 1 tablet by mouth once daily., Disp: , Rfl:     olmesartan (BENIcar) 40 mg tablet, TAKE 1 TABLET BY MOUTH EVERY DAY, Disp: 90 tablet, Rfl: 1    omega 3-dha-epa-fish oil (Fish OiL) 1,000 mg (120 mg-180 mg) capsule, Take 2 capsules (2,000 mg) by mouth 2 times a day., Disp: , Rfl:     OneTouch Ultra Test  "strip, USE AS DIRECTED 3 TIMES A DAY, Disp: 100 strip, Rfl: 1    pantoprazole (ProtoNix) 40 mg EC tablet, TAKE 1 TABLET (40 MG) BY MOUTH DAILY DO NOT CRUSH, CHEW, ORSPLIT, Disp: 90 tablet, Rfl: 1    rosuvastatin (Crestor) 20 mg tablet, TAKE 1 TABLET BY MOUTH EVERY DAY, Disp: 90 tablet, Rfl: 3    semaglutide (OZEMPIC) 1 mg/dose (4 mg/3 mL) pen injector, Inject 1 mg under the skin 1 (one) time per week., Disp: 3 mL, Rfl: 1    Current Facility-Administered Medications:     lidocaine-epinephrine (Xylocaine W/EPI) 1 %-1:100,000 injection 10 mL, 10 mL, injection, Once, Opal Corbin MD     Anthropometrics:  Anthropometrics  Height: 165.1 cm (5' 5\")  Weight: 85.8 kg (189 lb 2.5 oz)  BMI (Calculated): 31.48   Weight change:    Significant Weight Change: No  05/2024 Weight: 189 lbs  02/27/24 Weight:188 lbs 4.4 ounces.   11/2023 Weight 182 lbs 1.6 ounces  09/2023 Weight 190 lbs.     Lab Results   Component Value Date    HGBA1C 5.9 (H) 12/29/2023    CHOL 109 12/29/2023    LDLF - 09/06/2023    TRIG 208 (H) 12/29/2023    HDL 33.7 12/29/2023        Chemistry    Lab Results   Component Value Date/Time     05/06/2024 1400    K 4.5 05/06/2024 1400     05/06/2024 1400    CO2 27 05/06/2024 1400    BUN 20 05/06/2024 1400    CREATININE 0.69 05/06/2024 1400    Lab Results   Component Value Date/Time    CALCIUM 10.2 05/06/2024 1400    ALKPHOS 56 01/04/2024 0837    AST 17 01/04/2024 0837    ALT 20 01/04/2024 0837    BILITOT 0.7 01/04/2024 0837           Food and Nutrition Hx:  Pt has been using the plate method. She has not really been CHO counting.   She has been using a glucometer to test blood glucose about 129- 150 mg/dl when before values were 140 mg/dl when before to was 118- 140 mg/dl. She was placed on Ozempic and this has been increased. A1c was 8.9% and now it is at 5.9%. TG was at 416 to 208 mg/dl.   Pt has hernia surgery in January.   She reports she has been belching   Pt is waking up until 10:00 am. She is " trying to eat meals per day.     24 Diet Recall:  Meal 1: Breakfast is at 10:00 to include protein oats made with 0.5 -1 cup of cooked protein oats, 1 T of adria seeds, almond milk, and protein powder and 1 T of flaxseed and sometime 0.5 banana or prune or berries(kcal 400- 500 CHO 30- 45)   Meal 2: Lunch skipped or consumed at 2:00 pm to include a salad to include 3- 4 ounces of chicken, 2 cups of vegetables such as cucumber, tomatoes, 1 T of cranberries with low calorie marinade (kcal 300, CHO 20)    Meal 3: Dinner is at 6:00 and she will consume 4 ounces of protein with breading, 2 cups of green beans, or carrots  or broccoli and 1 cup of pasta with oil or pasta sauce (kcal 300, CHO 25)   Snacks: 1/2 of a protein drink (CHO 1). She has been consuming the bedtime snacks.   Beverages: 60 ounces of water per day. She occasionally drinks Crystal Light.     Allergies: None  Intolerance: None  Appetite: Good  Intake: >75%  GI Symptoms : reflux Frequency: infrequent  Swallowing Difficulty: No problems with swallowing  Dentition : own    Types of Activities: Walking and Gym Membership  Duration: 75 minutes*  3 times a week at the gym at moderate intensity to include 45 minutes and 30 minutes of cardio. She also water walking for 60 minutes once a week.  Total minutes per week is now 285 minutes from 240 minutes per week.     Sleep duration/quality : 5-6 hours and disrupted sleep. Pt has been sleeping better.   Sleep disorders: poor sleep hygiene    Supplements: Vitamin D and Fish Oil daily. She is not taking a complete MVI.     Feelings of Hunger?: Yes and will eat. Sore belly.   Physical Feeling: Physically full  Binging: Never  Cravings: None  Energy Levels: Stable    Food Preparation: Patient  Cooking Skills/Barriers: None reported  Grocery Shopping: Patient    Nutrition Focused Physical Exam:    Performed/Deferred: Deferred due to be being virtual visit    Nutrition Focused Physical Exam:        Malnutrition Present:  No    Estimated Energy Needs:    Weight Maintanence: 1,755 kcal/day  Weight Loss Needs: 1,255 kcal/day  Chiqui Brunson (REE x 1.2-1.3) - 500 calories per day for wt loss.     Nutrition Diagnosis:    Diagnosis Statement 1:  Diagnosis Status: Ongoing/Improving   Food- and nutrition-related knowledge deficit related to how to eat for diabetes as current A1c is at 8.9% as evidenced by reports by pt of the need to learn.    Diagnosis Statement 2:  Diagnosis: Improving   Excessive carbohydrate intake related to large portion of carbohydrate at some meals  as evidenced by CHO intake at meals can be 70+ grams or more .     Diagnosis Statement 3:   Diagnosis: Improving.   Physical inactivity related to sedentary lifestyle  as evidenced by reported PAL level of 1.2 .     Nutrition Interventions:  Medical nutrition therapy was given for HLD and diabetes.   Nutrition Counseling: CBT  Coordination of Care: None    Nutrition Goals:  carbohydrate consistency meal plan with aim for 30- 45 grams of carbohydrates at meals and 15 grams at snack to reduce A1c. Total energy intake of 1,255 calories per day for 1 lb wt loss per week.  Heart healthy meal plan with a low saturated fat intake to <5 -6 % of energy (less than 8 grams of saturated fat per day), reduced intake of added sugars (<10% of total energy), 25 grams of fiber with intake of viscous fiber to 5 g/day to 10 g/day, plant sterols/stanols to 2 g/day, 1.1 gram of omega three fatty acids to reduce LDL and TG levels. 1,500 mg sodium restriction per day.     Nutrition Recommendations:  Via teach back method patient verbalized understanding of the following topics:  1) Aim for the bedtime snack to see if this snack can be reduce blood glucose the next morning more to 130 mg/dl. The snack should be 15 grams of carbohydrates plus protein.   2) Strive to perform 3 deep reset breaths prior to eating. The rate of breathing can influence the pace of eating so by slowing down breathe,  eating pace may slow. Performing the 3 reset breathes can also separate thoughts and worries of the day from eating at meal time.   3) A general mindfulness meditation was given today. Setting aside some time for general mindfulness meditation can help us become centered and more mindful by focusing on breathe. Attached is the general mindfulness meditation. Set a regular time to meditate with aim for 6 days a week could help increase mindfulness related to eating.    Educational Handouts: General Mindfulness Meditation of the breath   CHO counting nutrition guide, Plate Method   Next session: hunger meditation and scale     Sandra Ortega, MS, RDN, LD, FAITH   Advanced Practice Clinical Dietitian  [email or web address]  Schedule line [phone]  Direct line [phone]     Readiness to Change : Good  Level of Understanding : Good  Anticipated Compliant : Good
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
## 2024-05-29 NOTE — PREPROCEDURE INSTRUCTIONS
Pre-Op Instructions & Checklist   Your surgery has been scheduled at [facility] at [address] [place], [address], [address], in the [facility]. Parking is to the left of the main entrance.  You will be contacted about the time of your surgery the day before your surgery. If you are unable to answer the phone, a detailed voicemail message will be left. Make sure that your voicemail box is not full so a message can be left. If you have not received a call by 3:00 pm you may call [phone] between the hours of 3:00 and 4:00 pm. Please be available by phone the night before/day of surgery in case there is a change in the schedule which may require you to arrive earlier/later.    14 DAYS BEFORE SURGERY STOP TAKING WEIGHT LOSS MEDICATIONS      7 DAYS BEFORE SURGERY STOP THESE MEDICATIONS:  Multiple Vitamins containing Vitamin E  Herbal supplements, Fish Oil, garlic pills, turmeric, CoQ enzyme  Stop taking aspirin, and aspirin-containing products as well as NSAID's such as Advil, Motrin, Aleve, Ibuprofen. Tylenol is okay to take for pain relief.   If you are currently taking Coumadin/Warfarin, we will have to coordinate that with your PCP &/or the Anticoagulation Clinic.  Patients taking GLP-1 inhibitors are asked to stop 1 week prior to surgery.  Since you inject Ozempic on Saturdays skip the Saturday dose (06/08/2024) before your surgery which is scheduled for 06/13/2024.  You may resume this medication the Saturday following surgery on 06/15/2024.    THE DAY BEFORE SURGERY:  Do not eat any food after midnight the night before surgery.   You are permitted to have clear liquids such as water, apple juice, plain tea or coffee (no milk or creamer), clear electrolyte-replenishing drinks such as Pedialyte, Gatorade, or Powerade (not yogurt or pulp-containing smoothies or juices such as orange juice) up to 2 hours before your surgery.    DAY OF SURGERY, TAKE THESE MEDICATIONS with a  small sip of water (if it is not listed, do not take it):    Take: Pantoprazole; escitalopram                ON THE MORNING OF SURGERY:  *Shower either the night before your surgery or the morning of your surgery  *Do not use moisturizers, creams, lotions or perfume, or make-up.  *Wear comfortable, loose fitting clothing.   *All jewelry and valuables should be left at home.  *Prosthetic devices such as contact lenses, hearing aids, dentures, eyelash extensions, hairpins and body piercings must be removed before surgery. Bring containers for eyeglasses/contacts, dentures, or hearing aids with you.  Diabetics: Please check fasting blood sugars upon waking up.  If fasting blood sugars are <80ml/dl, please drink 100ml/3oz. of apple juice no later than 2 hours prior to surgery.      BRING WITH YOU:   *Photo ID and insurance card  *Current list of medicines and allergies  *Pacemaker/Defibrillator/Heart stent cards  *Copy of your complete Advanced Directive/DHPOA-if applicable     SMOKING:  *Quitting smoking can make a huge difference to your health and recovery from surgery.    *If you need help with quitting, call 9-243-QUIT-NOW.  Alcohol:  *No alcoholic beverages for 48 hours before surgery.     AFTER OUTPATIENT SURGERY:  *A responsible adult MUST accompany you at the time of discharge and stay with you for 24 hours after your surgery.  *You may NOT drive yourself home after surgery.  *You may use a taxi or ride sharing service ([employer], Uber) to return home ONLY if you are to change the date accompanied by a friend or family member.  *Instructions for resuming your medications will be provided by your surgeon.     CONTACT SURGEON'S OFFICE IF YOU DEVELOP:  * Fever =/> 100.4 F   * New respiratory symptoms (e.g. cough, shortness of breath, respiratory distress, sore throat)  * Recent loss of taste or smell  *Flu like symptoms such as headache, fatigue or gastrointestinal symptoms  * If you develop any open sores, shingles,  burning or painful urination   AND/OR:  * You no longer wish to have the surgery.  * Any other personal circumstances change that may lead to the need to cancel or defer this surgery.  *You were admitted to any hospital within one week of your planned procedure.     If you have any questions regarding these preoperative instructions you may call [phone]. If you have questions regarding you surgical procedure, or post-operative care/recovery please call your surgeon's office

## 2024-05-30 ENCOUNTER — LAB (OUTPATIENT)
Dept: LAB | Facility: LAB | Age: 54
End: 2024-05-30
Payer: COMMERCIAL

## 2024-05-30 DIAGNOSIS — Z41.9 ELECTIVE SURGERY: ICD-10-CM

## 2024-05-30 DIAGNOSIS — E11.9 TYPE 2 DIABETES MELLITUS WITHOUT COMPLICATION, UNSPECIFIED WHETHER LONG TERM INSULIN USE (MULTI): ICD-10-CM

## 2024-05-30 LAB
EST. AVERAGE GLUCOSE BLD GHB EST-MCNC: 120 MG/DL
HBA1C MFR BLD: 5.8 %

## 2024-05-30 PROCEDURE — 83036 HEMOGLOBIN GLYCOSYLATED A1C: CPT

## 2024-06-11 ASSESSMENT — ENCOUNTER SYMPTOMS
CONFUSION: 0
BLACKOUTS: 0
WEAKNESS: 0
SWEATS: 0
SPEECH DIFFICULTY: 0
POLYPHAGIA: 0
POLYDIPSIA: 0
TREMORS: 0
DIZZINESS: 0
VISUAL CHANGE: 0
BLURRED VISION: 0
WEIGHT LOSS: 0
HEADACHES: 0
NERVOUS/ANXIOUS: 0
HUNGER: 0
FATIGUE: 0
SEIZURES: 0

## 2024-06-12 ENCOUNTER — ANESTHESIA EVENT (OUTPATIENT)
Dept: OPERATING ROOM | Facility: CLINIC | Age: 54
End: 2024-06-12
Payer: COMMERCIAL

## 2024-06-12 ASSESSMENT — ENCOUNTER SYMPTOMS
ABDOMINAL PAIN: 0
DYSURIA: 0
DIFFICULTY URINATING: 0
ACTIVITY CHANGE: 0
FATIGUE: 0
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
## 2024-06-13 ENCOUNTER — ANESTHESIA (OUTPATIENT)
Dept: OPERATING ROOM | Facility: CLINIC | Age: 54
End: 2024-06-13
Payer: COMMERCIAL

## 2024-06-13 ENCOUNTER — HOSPITAL ENCOUNTER (OUTPATIENT)
Facility: CLINIC | Age: 54
Setting detail: OUTPATIENT SURGERY
Discharge: HOME | End: 2024-06-13
Attending: OBSTETRICS & GYNECOLOGY | Admitting: OBSTETRICS & GYNECOLOGY
Payer: COMMERCIAL

## 2024-06-13 VITALS
TEMPERATURE: 97 F | WEIGHT: 187.17 LBS | OXYGEN SATURATION: 94 % | BODY MASS INDEX: 31.18 KG/M2 | RESPIRATION RATE: 16 BRPM | DIASTOLIC BLOOD PRESSURE: 57 MMHG | HEART RATE: 61 BPM | HEIGHT: 65 IN | SYSTOLIC BLOOD PRESSURE: 111 MMHG

## 2024-06-13 DIAGNOSIS — E11.9 TYPE 2 DIABETES MELLITUS WITHOUT COMPLICATION, UNSPECIFIED WHETHER LONG TERM INSULIN USE (MULTI): ICD-10-CM

## 2024-06-13 DIAGNOSIS — G89.18 POST-OP PAIN: ICD-10-CM

## 2024-06-13 DIAGNOSIS — Z41.9 ELECTIVE SURGERY: ICD-10-CM

## 2024-06-13 DIAGNOSIS — N95.0 PMB (POSTMENOPAUSAL BLEEDING): Primary | ICD-10-CM

## 2024-06-13 DIAGNOSIS — R52 POSTPARTUM PAIN (HHS-HCC): ICD-10-CM

## 2024-06-13 LAB — GLUCOSE BLD MANUAL STRIP-MCNC: 155 MG/DL (ref 74–99)

## 2024-06-13 PROCEDURE — 88305 TISSUE EXAM BY PATHOLOGIST: CPT | Mod: TC,SUR | Performed by: OBSTETRICS & GYNECOLOGY

## 2024-06-13 PROCEDURE — 82947 ASSAY GLUCOSE BLOOD QUANT: CPT

## 2024-06-13 PROCEDURE — 3600000003 HC OR TIME - INITIAL BASE CHARGE - PROCEDURE LEVEL THREE: Performed by: OBSTETRICS & GYNECOLOGY

## 2024-06-13 PROCEDURE — 7100000009 HC PHASE TWO TIME - INITIAL BASE CHARGE: Performed by: OBSTETRICS & GYNECOLOGY

## 2024-06-13 PROCEDURE — 3700000002 HC GENERAL ANESTHESIA TIME - EACH INCREMENTAL 1 MINUTE: Performed by: OBSTETRICS & GYNECOLOGY

## 2024-06-13 PROCEDURE — 2500000001 HC RX 250 WO HCPCS SELF ADMINISTERED DRUGS (ALT 637 FOR MEDICARE OP): Performed by: OBSTETRICS & GYNECOLOGY

## 2024-06-13 PROCEDURE — 2500000004 HC RX 250 GENERAL PHARMACY W/ HCPCS (ALT 636 FOR OP/ED): Performed by: ANESTHESIOLOGY

## 2024-06-13 PROCEDURE — 2500000004 HC RX 250 GENERAL PHARMACY W/ HCPCS (ALT 636 FOR OP/ED): Performed by: OBSTETRICS & GYNECOLOGY

## 2024-06-13 PROCEDURE — 58558 HYSTEROSCOPY BIOPSY: CPT | Performed by: OBSTETRICS & GYNECOLOGY

## 2024-06-13 PROCEDURE — A4217 STERILE WATER/SALINE, 500 ML: HCPCS | Performed by: OBSTETRICS & GYNECOLOGY

## 2024-06-13 PROCEDURE — 2720000007 HC OR 272 NO HCPCS: Performed by: OBSTETRICS & GYNECOLOGY

## 2024-06-13 PROCEDURE — 2500000001 HC RX 250 WO HCPCS SELF ADMINISTERED DRUGS (ALT 637 FOR MEDICARE OP): Performed by: ANESTHESIOLOGY

## 2024-06-13 PROCEDURE — 2500000004 HC RX 250 GENERAL PHARMACY W/ HCPCS (ALT 636 FOR OP/ED): Performed by: NURSE ANESTHETIST, CERTIFIED REGISTERED

## 2024-06-13 PROCEDURE — 7100000010 HC PHASE TWO TIME - EACH INCREMENTAL 1 MINUTE: Performed by: OBSTETRICS & GYNECOLOGY

## 2024-06-13 PROCEDURE — 3600000008 HC OR TIME - EACH INCREMENTAL 1 MINUTE - PROCEDURE LEVEL THREE: Performed by: OBSTETRICS & GYNECOLOGY

## 2024-06-13 PROCEDURE — 3700000001 HC GENERAL ANESTHESIA TIME - INITIAL BASE CHARGE: Performed by: OBSTETRICS & GYNECOLOGY

## 2024-06-13 RX ORDER — SODIUM CHLORIDE 0.9 G/100ML
IRRIGANT IRRIGATION AS NEEDED
Status: DISCONTINUED | OUTPATIENT
Start: 2024-06-13 | End: 2024-06-13 | Stop reason: HOSPADM

## 2024-06-13 RX ORDER — SODIUM CHLORIDE, SODIUM LACTATE, POTASSIUM CHLORIDE, CALCIUM CHLORIDE 600; 310; 30; 20 MG/100ML; MG/100ML; MG/100ML; MG/100ML
100 INJECTION, SOLUTION INTRAVENOUS CONTINUOUS
Status: DISCONTINUED | OUTPATIENT
Start: 2024-06-13 | End: 2024-06-13 | Stop reason: HOSPADM

## 2024-06-13 RX ORDER — PROPOFOL 10 MG/ML
INJECTION, EMULSION INTRAVENOUS CONTINUOUS PRN
Status: DISCONTINUED | OUTPATIENT
Start: 2024-06-13 | End: 2024-06-13

## 2024-06-13 RX ORDER — MIDAZOLAM HYDROCHLORIDE 1 MG/ML
INJECTION, SOLUTION INTRAMUSCULAR; INTRAVENOUS AS NEEDED
Status: DISCONTINUED | OUTPATIENT
Start: 2024-06-13 | End: 2024-06-13

## 2024-06-13 RX ORDER — CHLORHEXIDINE GLUCONATE 40 MG/ML
SOLUTION TOPICAL AS NEEDED
Status: DISCONTINUED | OUTPATIENT
Start: 2024-06-13 | End: 2024-06-13 | Stop reason: HOSPADM

## 2024-06-13 RX ORDER — KETOROLAC TROMETHAMINE 30 MG/ML
INJECTION, SOLUTION INTRAMUSCULAR; INTRAVENOUS AS NEEDED
Status: DISCONTINUED | OUTPATIENT
Start: 2024-06-13 | End: 2024-06-13

## 2024-06-13 RX ORDER — ACETAMINOPHEN 325 MG/1
650 TABLET ORAL EVERY 6 HOURS PRN
Qty: 56 TABLET | Refills: 0 | Status: SHIPPED | OUTPATIENT
Start: 2024-06-13 | End: 2024-06-20

## 2024-06-13 RX ORDER — FENTANYL CITRATE 50 UG/ML
25 INJECTION, SOLUTION INTRAMUSCULAR; INTRAVENOUS EVERY 5 MIN PRN
Status: DISCONTINUED | OUTPATIENT
Start: 2024-06-13 | End: 2024-06-13 | Stop reason: HOSPADM

## 2024-06-13 RX ORDER — IBUPROFEN 600 MG/1
600 TABLET ORAL EVERY 6 HOURS PRN
Qty: 28 TABLET | Refills: 0 | Status: SHIPPED | OUTPATIENT
Start: 2024-06-13 | End: 2024-06-20

## 2024-06-13 RX ORDER — FENTANYL CITRATE 50 UG/ML
INJECTION, SOLUTION INTRAMUSCULAR; INTRAVENOUS AS NEEDED
Status: DISCONTINUED | OUTPATIENT
Start: 2024-06-13 | End: 2024-06-13

## 2024-06-13 RX ORDER — OXYCODONE HYDROCHLORIDE 10 MG/1
10 TABLET ORAL EVERY 4 HOURS PRN
Status: DISCONTINUED | OUTPATIENT
Start: 2024-06-13 | End: 2024-06-13 | Stop reason: HOSPADM

## 2024-06-13 RX ORDER — LIDOCAINE IN NACL,ISO-OSMOT/PF 30 MG/3 ML
0.1 SYRINGE (ML) INJECTION ONCE
Status: DISCONTINUED | OUTPATIENT
Start: 2024-06-13 | End: 2024-06-13 | Stop reason: HOSPADM

## 2024-06-13 RX ORDER — ONDANSETRON HYDROCHLORIDE 2 MG/ML
4 INJECTION, SOLUTION INTRAVENOUS ONCE AS NEEDED
Status: DISCONTINUED | OUTPATIENT
Start: 2024-06-13 | End: 2024-06-13 | Stop reason: HOSPADM

## 2024-06-13 RX ORDER — OXYCODONE HYDROCHLORIDE 5 MG/1
5 TABLET ORAL EVERY 4 HOURS PRN
Status: DISCONTINUED | OUTPATIENT
Start: 2024-06-13 | End: 2024-06-13 | Stop reason: HOSPADM

## 2024-06-13 RX ORDER — ONDANSETRON HYDROCHLORIDE 2 MG/ML
INJECTION, SOLUTION INTRAVENOUS AS NEEDED
Status: DISCONTINUED | OUTPATIENT
Start: 2024-06-13 | End: 2024-06-13

## 2024-06-13 ASSESSMENT — PAIN - FUNCTIONAL ASSESSMENT
PAIN_FUNCTIONAL_ASSESSMENT: 0-10

## 2024-06-13 ASSESSMENT — PATIENT HEALTH QUESTIONNAIRE - PHQ9
SUM OF ALL RESPONSES TO PHQ9 QUESTIONS 1 AND 2: 0
1. LITTLE INTEREST OR PLEASURE IN DOING THINGS: NOT AT ALL
2. FEELING DOWN, DEPRESSED OR HOPELESS: NOT AT ALL

## 2024-06-13 ASSESSMENT — PAIN SCALES - GENERAL
PAINLEVEL_OUTOF10: 1
PAINLEVEL_OUTOF10: 4
PAINLEVEL_OUTOF10: 3
PAINLEVEL_OUTOF10: 0 - NO PAIN

## 2024-06-13 ASSESSMENT — PAIN DESCRIPTION - LOCATION: LOCATION: PELVIS

## 2024-06-13 NOTE — ANESTHESIA PREPROCEDURE EVALUATION
Patient: Emperatriz Carter    Procedure Information       Date/Time: 06/13/24 0830    Procedure: HYSTEROSCOPY, DIAGNOSTIC - This case is a hysteroscopy, D&C with polypectomy (41509)    [place] [facility]    Surgeons: Helen Hernandez MD            Relevant Problems   Anesthesia (within normal limits)      Cardiac   (+) Essential hypertension   (+) Mixed hyperlipidemia      Pulmonary (within normal limits)      Neuro   (+) YAZMIN (generalized anxiety disorder)      GI   (+) GERD (gastroesophageal reflux disease) (Well controlled)      /Renal (within normal limits)      Liver (within normal limits)      Endocrine   (+) Type 2 diabetes mellitus without complication (Multi) (, 2 weeks since last does of semiglutide)      Hematology (within normal limits)      Musculoskeletal (within normal limits)      HEENT (within normal limits)      ID (within normal limits)      Skin (within normal limits)      GYN (within normal limits)       Clinical information reviewed:   Tobacco  Allergies  Meds   Med Hx  Surg Hx  OB Status  Fam Hx  Soc   Hx        NPO Detail:  NPO/Void Status  Carbohydrate Drink Given Prior to Surgery? : N  Date of Last Liquid: 06/13/24  Time of Last Liquid: 0530  Date of Last Solid: 06/12/24  Time of Last Solid: 2100  Last Intake Type: Clear fluids         Physical Exam    Airway  Mallampati: III  TM distance: >3 FB  Neck ROM: full  Comments: Caps everywhere   Cardiovascular    Dental    Pulmonary    Abdominal        Anesthesia Plan    History of general anesthesia?: yes  History of complications of general anesthesia?: no    ASA 3     MAC     intravenous induction   Anesthetic plan and risks discussed with patient and spouse.    Plan discussed with CRNA.

## 2024-06-13 NOTE — ANESTHESIA POSTPROCEDURE EVALUATION
Patient: Emperatriz Carter    Procedure Summary       Date: 06/13/24 Room / Location: [facility] [place] [facility] 03 / [facility]    Anesthesia Start: 0842 Anesthesia Stop: 0926    Procedure: HYSTEROSCOPY, DIAGNOSTIC Diagnosis:       PMB (postmenopausal bleeding)      (PMB (postmenopausal bleeding) [N95.0])    Surgeons: Helen Hernandez MD Responsible Provider: Pankaj Petersen DO    Anesthesia Type: MAC ASA Status: 3            Anesthesia Type: MAC    Vitals Value Taken Time   /57 06/13/24 0954   Temp 36.1 °C (97 °F) 06/13/24 0954   Pulse 61 06/13/24 0954   Resp 16 06/13/24 0954   SpO2 94 % 06/13/24 0954       Anesthesia Post Evaluation    Patient location during evaluation: PACU  Patient participation: complete - patient participated  Level of consciousness: awake  Pain management: satisfactory to patient  Multimodal analgesia pain management approach  Airway patency: patent  Cardiovascular status: acceptable  Respiratory status: acceptable  Hydration status: acceptable  Postoperative Nausea and Vomiting: none    There were no known notable events for this encounter.

## 2024-06-13 NOTE — INTERVAL H&P NOTE
"Interval History and Physical    Patient Description:  Emperatriz Carter is a 54 y.o. P2 presenting for diagnostic hysteroscopy after PMB>    Laboratory: I have reviewed pertinent lab studies.     Physical Exam:  /70   Pulse 79   Temp 36.9 °C (98.4 °F) (Temporal)   Resp 16   Ht 1.651 m (5' 5\")   Wt 84.9 kg (187 lb 2.7 oz)   BMI 31.15 kg/m²   General: Alert and oriented, in NAD  Neuro: Awake, alert, conversational  CV: Regular rate  Respiratory: Even and unlabored on RA  Extremities: Full ROM  Psych: appropriate mood and affect    There is no change in physical condition since the previously submitted Admission History and Physical Examination.    Plans: Proceed with hysteroscopy, D&C as scheduled. Consents to be signed day of surgery.    Seen with Dr. David Mccann MD, PGY-1      "

## 2024-06-13 NOTE — OP NOTE
Date: 2024  OR Location: [facility] OR    Name: Emperatriz Carter, : 1970, Age: 54 y.o., MRN: [id number], Sex: female    Diagnosis  Pre-op Diagnosis     * PMB (postmenopausal bleeding) [N95.0] Post-op Diagnosis     * PMB (postmenopausal bleeding) [N95.0]     Procedures  HYSTEROSCOPY, DIAGNOSTIC  94713 - MT HYSTEROSCOPY DIAGNOSTIC SEPARATE PROCEDURE      Surgeons      * Helen Hernandez - Primary    Resident/Fellow/Other Assistant:  Dior Mccann MD - Resident, assisting    Procedure Summary  Anesthesia: Monitor Anesthesia Care  ASA: III  Anesthesia Staff: Anesthesiologist: Pankaj Petersen DO  CRNA: ERROL Mcdaniels-CRNA  Estimated Blood Loss: 5 mL  Intra-op Medications:   Administrations occurring from 0830 to 0920 on 24:   Medication Name Total Dose   sodium chloride 0.9 % irrigation solution 3,000 mL   chlorhexidine (Hibiclens) 4 % liquid 1 Application   lactated Ringer's infusion Cannot be calculated              Anesthesia Record               Intraprocedure I/O Totals          Intake    Dexmedetomidine 0.00 mL    The total shown is the total volume documented since Anesthesia Start was filed.    Propofol Drip 0.00 mL    The total shown is the total volume documented since Anesthesia Start was filed.    Total Intake 0 mL          Specimen:   ID Type Source Tests Collected by Time   1 : ENDOMETRIAL CURETTINGS Tissue ENDOMETRIUM CURETTINGS SURGICAL PATHOLOGY EXAM Helen Hernandez MD 2024 0850   2 : ENDOCERVICAL CURETTINGS Tissue ENDOCERVIX CURETTINGS SURGICAL PATHOLOGY EXAM Helen Hernandez MD 2024 0908        Staff:   Circulator: Dior Ventura Person: Oma         Procedure Details:  Indications: 55 yo female with PMB and thickened endometrial stripe to 1.3cm on TVUS.    Findings: External genitalia normal. Normal appearing cervix. Small, anteverted uterus with no adnexal masses or tenderness. Atrophic endometrium with fundal polyp and adhesions at both  ostia.    Complications:   none apparent        EBL 5 ml. Fluid Deficit 90ml    The patient was taken to the operating room where GA was administered. She was then positioned in the dorsal lithotomy position and a bimanual exam was performed with the above findings. The patient was then prepped and draped in the normal sterile fashion.  Speculum was placed for visualization of the cervix. Tenaculum was placed on the anterior lip of the cervix for traction. The cervix was sounded to 8cm. The uterus was dilated sequentially. The audra Aveta hysteroscope was placed in the endometrial canal and the above findings were noted. The fundal polyp was removed with the resectoscope and the adhesions at the ostia were taken down. Endometrial samples were taken from all walls of the uterus. Sharp curettage was used to take endocervical samples and endometrial curettage was used to obtain a good uterine cry.    All counts were correct.  The specimen was sent to pathology.  Dr. Hernandez was present for the entire procedure. The patient was taken to PACU in stable condition.

## 2024-06-13 NOTE — DISCHARGE INSTRUCTIONS
Post-Operative Instructions     You may experience spotting and cramping after your procedure. You may take Motrin and Tylenol every 6 hours as needed for pain. You can alternate these medications every 3 hours. Prescriptions have been sent to your pharmacy.    Call if you have:  Vaginal bleeding soaking >2 pads per hour for 2 hours  Temperature greater than 100.4  Persistent nausea and vomiting  Severe uncontrolled pain  Difficulty breathing, headache or visual disturbances  Hives  Persistent dizziness or light-headedness  Extreme fatigue  Any other questions or concerns you may have after discharge    In an emergency, call 911 or go to an Emergency Department at a nearby hospital.     Follow up with Dr. Hernandez in 2 weeks.

## 2024-06-18 ENCOUNTER — APPOINTMENT (OUTPATIENT)
Dept: PRIMARY CARE | Facility: CLINIC | Age: 54
End: 2024-06-18
Payer: COMMERCIAL

## 2024-06-18 VITALS
HEART RATE: 74 BPM | SYSTOLIC BLOOD PRESSURE: 141 MMHG | OXYGEN SATURATION: 98 % | BODY MASS INDEX: 31.45 KG/M2 | WEIGHT: 189 LBS | DIASTOLIC BLOOD PRESSURE: 79 MMHG

## 2024-06-18 DIAGNOSIS — I10 ESSENTIAL HYPERTENSION: Primary | ICD-10-CM

## 2024-06-18 DIAGNOSIS — E78.2 MIXED HYPERLIPIDEMIA: ICD-10-CM

## 2024-06-18 DIAGNOSIS — N95.0 PMB (POSTMENOPAUSAL BLEEDING): ICD-10-CM

## 2024-06-18 DIAGNOSIS — Z00.00 ENCOUNTER FOR ROUTINE ADULT HEALTH EXAMINATION WITHOUT ABNORMAL FINDINGS: ICD-10-CM

## 2024-06-18 DIAGNOSIS — E11.9 TYPE 2 DIABETES MELLITUS WITHOUT COMPLICATION, UNSPECIFIED WHETHER LONG TERM INSULIN USE (MULTI): ICD-10-CM

## 2024-06-18 DIAGNOSIS — F41.1 GAD (GENERALIZED ANXIETY DISORDER): ICD-10-CM

## 2024-06-18 PROBLEM — R73.01 IMPAIRED FASTING GLUCOSE: Status: RESOLVED | Noted: 2023-09-14 | Resolved: 2024-06-18

## 2024-06-18 PROCEDURE — 4010F ACE/ARB THERAPY RXD/TAKEN: CPT | Performed by: INTERNAL MEDICINE

## 2024-06-18 PROCEDURE — 99214 OFFICE O/P EST MOD 30 MIN: CPT | Performed by: INTERNAL MEDICINE

## 2024-06-18 PROCEDURE — 3044F HG A1C LEVEL LT 7.0%: CPT | Performed by: INTERNAL MEDICINE

## 2024-06-18 PROCEDURE — 1036F TOBACCO NON-USER: CPT | Performed by: INTERNAL MEDICINE

## 2024-06-18 PROCEDURE — 3078F DIAST BP <80 MM HG: CPT | Performed by: INTERNAL MEDICINE

## 2024-06-18 PROCEDURE — 3077F SYST BP >= 140 MM HG: CPT | Performed by: INTERNAL MEDICINE

## 2024-06-18 RX ORDER — SEMAGLUTIDE 1.34 MG/ML
1 INJECTION, SOLUTION SUBCUTANEOUS
Refills: 1 | OUTPATIENT
Start: 2024-06-18

## 2024-06-18 ASSESSMENT — ENCOUNTER SYMPTOMS
HUNGER: 0
VISUAL CHANGE: 0
CONFUSION: 0
POLYDIPSIA: 0
WEAKNESS: 0
SEIZURES: 0
BLACKOUTS: 0
NERVOUS/ANXIOUS: 0
BLURRED VISION: 0
SWEATS: 0
FATIGUE: 0
DIZZINESS: 0
WEIGHT LOSS: 0
POLYPHAGIA: 0
TREMORS: 0
SPEECH DIFFICULTY: 0
HEADACHES: 0

## 2024-06-18 ASSESSMENT — PATIENT HEALTH QUESTIONNAIRE - PHQ9
SUM OF ALL RESPONSES TO PHQ9 QUESTIONS 1 AND 2: 0
2. FEELING DOWN, DEPRESSED OR HOPELESS: NOT AT ALL
1. LITTLE INTEREST OR PLEASURE IN DOING THINGS: NOT AT ALL

## 2024-06-18 ASSESSMENT — PAIN SCALES - GENERAL: PAINLEVEL: 1

## 2024-06-18 NOTE — ASSESSMENT & PLAN NOTE
Well-controlled most recent A1c 5.8% no known complications, continue Ozempic 1 mg  Has noted morning hyperglycemic excursions not consistent with A1c, will obtain CGM for further clarification.  May be provoked by cortisol and or impairment in sleep  Rerefer to nutritionist has been working well for her.
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
## 2024-06-18 NOTE — ASSESSMENT & PLAN NOTE
Improved with Lexapro 10mg though with persistent health related anxiety, declines additional management at this time.  Using mindfulness as well.

## 2024-06-26 NOTE — RESULT ENCOUNTER NOTE
Final pathology from the hysteroscopy D&C shows benign endometrial polyp.  No abnormal cells or cancer, this is great news.  I will see you for your postop visit.

## 2024-07-01 ENCOUNTER — APPOINTMENT (OUTPATIENT)
Dept: OBSTETRICS AND GYNECOLOGY | Facility: CLINIC | Age: 54
End: 2024-07-01
Payer: COMMERCIAL

## 2024-07-01 VITALS
WEIGHT: 190 LBS | HEIGHT: 65 IN | DIASTOLIC BLOOD PRESSURE: 79 MMHG | BODY MASS INDEX: 31.65 KG/M2 | SYSTOLIC BLOOD PRESSURE: 150 MMHG

## 2024-07-01 DIAGNOSIS — N94.9 ADNEXAL CYST: Primary | ICD-10-CM

## 2024-07-01 PROCEDURE — 99214 OFFICE O/P EST MOD 30 MIN: CPT | Performed by: OBSTETRICS & GYNECOLOGY
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
## 2024-08-07 DIAGNOSIS — F41.1 GAD (GENERALIZED ANXIETY DISORDER): ICD-10-CM

## 2024-08-07 RX ORDER — ESCITALOPRAM OXALATE 10 MG/1
10 TABLET ORAL DAILY
Qty: 90 TABLET | Refills: 1 | Status: SHIPPED | OUTPATIENT
Start: 2024-08-07 | End: 2024-08-11

## 2024-08-09 DIAGNOSIS — F41.1 GAD (GENERALIZED ANXIETY DISORDER): ICD-10-CM

## 2024-08-11 RX ORDER — ESCITALOPRAM OXALATE 10 MG/1
10 TABLET ORAL DAILY
Qty: 90 TABLET | Refills: 1 | Status: SHIPPED | OUTPATIENT
Start: 2024-08-11

## 2024-08-13 ENCOUNTER — APPOINTMENT (OUTPATIENT)
Dept: NUTRITION | Facility: CLINIC | Age: 54
End: 2024-08-13
Payer: COMMERCIAL

## 2024-08-24 DIAGNOSIS — K21.9 GASTROESOPHAGEAL REFLUX DISEASE, UNSPECIFIED WHETHER ESOPHAGITIS PRESENT: ICD-10-CM

## 2024-08-26 RX ORDER — PANTOPRAZOLE SODIUM 40 MG/1
40 TABLET, DELAYED RELEASE ORAL DAILY
Qty: 90 TABLET | Refills: 1 | Status: SHIPPED | OUTPATIENT
Start: 2024-08-26

## 2024-10-07 DIAGNOSIS — F41.1 GAD (GENERALIZED ANXIETY DISORDER): ICD-10-CM

## 2024-10-07 RX ORDER — ESCITALOPRAM OXALATE 20 MG/1
20 TABLET ORAL DAILY
Qty: 90 TABLET | Refills: 0 | Status: SHIPPED | OUTPATIENT
Start: 2024-10-07

## 2024-10-08 ENCOUNTER — HOSPITAL ENCOUNTER (OUTPATIENT)
Dept: RADIOLOGY | Facility: CLINIC | Age: 54
Discharge: HOME | End: 2024-10-08
Payer: COMMERCIAL

## 2024-10-08 VITALS — HEIGHT: 65 IN | BODY MASS INDEX: 31.66 KG/M2 | WEIGHT: 190.04 LBS

## 2024-10-08 DIAGNOSIS — R92.8 OTHER ABNORMAL AND INCONCLUSIVE FINDINGS ON DIAGNOSTIC IMAGING OF BREAST: ICD-10-CM

## 2024-10-08 PROCEDURE — 76642 ULTRASOUND BREAST LIMITED: CPT | Mod: RT

## 2024-10-08 PROCEDURE — 76642 ULTRASOUND BREAST LIMITED: CPT | Performed by: STUDENT IN AN ORGANIZED HEALTH CARE EDUCATION/TRAINING PROGRAM

## 2024-10-08 PROCEDURE — 77062 BREAST TOMOSYNTHESIS BI: CPT | Performed by: STUDENT IN AN ORGANIZED HEALTH CARE EDUCATION/TRAINING PROGRAM

## 2024-10-08 PROCEDURE — 77066 DX MAMMO INCL CAD BI: CPT | Performed by: STUDENT IN AN ORGANIZED HEALTH CARE EDUCATION/TRAINING PROGRAM

## 2024-10-08 PROCEDURE — 77062 BREAST TOMOSYNTHESIS BI: CPT

## 2024-10-08 PROCEDURE — 76982 USE 1ST TARGET LESION: CPT | Mod: RT

## 2024-10-22 ENCOUNTER — APPOINTMENT (OUTPATIENT)
Dept: NUTRITION | Facility: CLINIC | Age: 54
End: 2024-10-22
Payer: COMMERCIAL

## 2024-10-22 VITALS — BODY MASS INDEX: 31.59 KG/M2 | WEIGHT: 189.6 LBS | HEIGHT: 65 IN

## 2024-10-22 DIAGNOSIS — E11.9 TYPE 2 DIABETES MELLITUS WITHOUT COMPLICATION, UNSPECIFIED WHETHER LONG TERM INSULIN USE (MULTI): Primary | ICD-10-CM

## 2024-10-22 PROCEDURE — 97803 MED NUTRITION INDIV SUBSEQ: CPT | Performed by: DIETITIAN, REGISTERED

## 2024-10-22 NOTE — PROGRESS NOTES
Reason for Nutrition Visit:  Pt is a 54 y.o. female being seen at [place]. Referring provider is Beba Richard DO , effective date is 6/18/24.     1. Type 2 diabetes mellitus without complication, unspecified whether long term insulin use (Multi)              Past Medical Hx:  Patient Active Problem List   Diagnosis    Essential hypertension    YAZMIN (generalized anxiety disorder)    Mixed hyperlipidemia    GERD (gastroesophageal reflux disease)    Type 2 diabetes mellitus without complication (Multi)    Arthralgia    Dermoid cyst of conjunctiva    Fatigue    Other disorders of intestinal carbohydrate absorption    Umbilical hernia    Vitamin B12 deficiency    Vitamin D deficiency    Ventral incisional hernia    PMB (postmenopausal bleeding)        Current Outpatient Medications:     ALPRAZolam (Xanax) 0.25 mg tablet, Take 1 tablet (0.25 mg) by mouth 3 times a day as needed for anxiety., Disp: 6 tablet, Rfl: 0    cholecalciferol (Vitamin D3) 25 MCG (1000 UT) capsule, Take 1 capsule (25 mcg) by mouth once daily., Disp: , Rfl:     CINNAMON BARK ORAL, Take 2 capsules by mouth once daily., Disp: , Rfl:     escitalopram (Lexapro) 20 mg tablet, Take 1 tablet (20 mg) by mouth once daily., Disp: 90 tablet, Rfl: 0    lancets (OneTouch Delica Plus Lancet) 33 gauge misc, USE AS DIRECTED three times a day, Disp: 100 each, Rfl: 3    loratadine (Claritin) 10 mg tablet, Take 1 tablet (10 mg) by mouth once daily., Disp: , Rfl:     melatonin 3 mg tablet, Take 1 tablet (3 mg) by mouth as needed at bedtime for sleep., Disp: , Rfl:     multivitamin tablet, Take 1 tablet by mouth once daily., Disp: , Rfl:     olmesartan (BENIcar) 40 mg tablet, TAKE 1 TABLET BY MOUTH EVERY DAY, Disp: 90 tablet, Rfl: 1    omega 3-dha-epa-fish oil (Fish OiL) 1,000 mg (120 mg-180 mg) capsule, Take 2 capsules (2,000 mg) by mouth 2 times a day., Disp: , Rfl:     OneTouch Ultra Test strip, USE AS DIRECTED 3 TIMES A DAY, Disp: 100 strip, Rfl: 1    pantoprazole  "(ProtoNix) 40 mg EC tablet, TAKE 1 TABLET (40 MG) BY MOUTH DAILY DO NOT CRUSH, CHEW, ORSPLIT, Disp: 90 tablet, Rfl: 1    rosuvastatin (Crestor) 20 mg tablet, TAKE 1 TABLET BY MOUTH EVERY DAY, Disp: 90 tablet, Rfl: 3    semaglutide (OZEMPIC) 1 mg/dose (4 mg/3 mL) pen injector, Inject 1 mg under the skin 1 (one) time per week., Disp: 3 mL, Rfl: 5    Current Facility-Administered Medications:     lidocaine-epinephrine (Xylocaine W/EPI) 1 %-1:100,000 injection 10 mL, 10 mL, injection, Once, Opal Corbin MD     Anthropometrics:  Anthropometrics  Height: 165.1 cm (5' 5\")  Weight: 86 kg (189 lb 9.5 oz)  BMI (Calculated): 31.55   Weight change:    Significant Weight Change: No  05/2024 Weight: 189 lbs  02/27/24 Weight:188 lbs 4.4 ounces.   11/2023 Weight 182 lbs 1.6 ounces  09/2023 Weight 190 lbs.   10/22/24 Weight 189 lbs     Lab Results   Component Value Date    HGBA1C 5.8 (H) 05/30/2024    CHOL 109 12/29/2023    LDLF - 09/06/2023    TRIG 208 (H) 12/29/2023    HDL 33.7 12/29/2023        Chemistry    Lab Results   Component Value Date/Time     05/06/2024 1400    K 4.5 05/06/2024 1400     05/06/2024 1400    CO2 27 05/06/2024 1400    BUN 20 05/06/2024 1400    CREATININE 0.69 05/06/2024 1400    Lab Results   Component Value Date/Time    CALCIUM 10.2 05/06/2024 1400    ALKPHOS 56 01/04/2024 0837    AST 17 01/04/2024 0837    ALT 20 01/04/2024 0837    BILITOT 0.7 01/04/2024 0837           Food and Nutrition Hx:  Pt has been using the plate method. She has not really been CHO counting.   She has been using a glucometer to test blood glucose about 130 mg/dl. She was placed on Ozempic and this has been increased. A1c was 8.9% and now it is at 5.9% and 5.8%. TG was at 416 to 208 mg/dl.   Pt feels good. Her body composition is changing and she is wearing smaller pants.    Pt is waking up until 10:00 am. She is trying to eat meals per day.     24 Diet Recall:  Meal 1: Breakfast is at 10:00 to include protein oats " made with 0.5 -1 cup of cooked protein oats, 1 T of adria seeds, almond milk, and protein powder and 1 T of flaxseed and sometime 0.5 banana or prune or berries(kcal 400- 500 CHO 30- 45)   Meal 2: Lunch skipped or consumed at 2:00 pm to include a salad to include 3- 4 ounces of chicken, 2 cups of vegetables such as cucumber, tomatoes, 1 T of cranberries with low calorie marinade (kcal 300, CHO 20)    Meal 3: Dinner is at 6:00 and she will consume 4 ounces of protein with breading, 2 cups of green beans, or carrots  or broccoli and 1 cup of pasta with oil or pasta sauce (kcal 300, CHO 25)   Snacks: cottage and fruit   Beverages: 60 ounces of water per day. She occasionally drinks Crystal Light.     Allergies: None  Intolerance: None  Appetite: Good  Intake: >75%  GI Symptoms : reflux Frequency: infrequent  Swallowing Difficulty: No problems with swallowing  Dentition : own    Types of Activities: Walking and Gym Membership  Duration: 90 minutes*  3 times a week at the gym at moderate intensity to include 60 minutes and 30 minutes of cardio. She also walking for 30 minutes once a week.  Total minutes per week is now 300 minutes per week  from 240 minutes per week.     Sleep duration/quality : 5-6 hours and disrupted sleep. Pt has been sleeping better.   Sleep disorders: poor sleep hygiene    Supplements: Vitamin D and Fish Oil daily. She is not taking a complete MVI.     Feelings of Hunger?: Yes and will eat. Sore belly.       Physical Feeling: Physically full  Binging: Never  Cravings: None  Energy Levels: Stable    Food Preparation: Patient  Cooking Skills/Barriers: None reported  Grocery Shopping: Patient    Nutrition Focused Physical Exam:    Performed/Deferred: Deferred due to be being virtual visit    Nutrition Focused Physical Exam:        Malnutrition Present: No    Estimated Energy Needs:    Weight Maintanence: 1,755 kcal/day  Weight Loss Needs: 1,255 kcal/day  [place] (REE x 1.2-1.3) - 500 calories  per day for wt loss.     Nutrition Diagnosis:    Diagnosis Statement 1:  Diagnosis Status: Ongoing/Improving A1c now is at 5.9%.   Food- and nutrition-related knowledge deficit related to how to eat for diabetes as current A1c is at 8.9% as evidenced by reports by pt of the need to learn.     Diagnosis Statement 2:  Diagnosis: Improving   Excessive carbohydrate intake related to large portion of carbohydrate at some meals  as evidenced by CHO intake at meals can be 70+ grams or more .     Diagnosis Statement 3:   Diagnosis: Improving.   Physical inactivity related to sedentary lifestyle  as evidenced by reported PAL level of 1.2 .     Nutrition Interventions:  Medical nutrition therapy was given for HLD and diabetes.   Nutrition Counseling: CBT  Coordination of Care: None    Nutrition Goals:  carbohydrate consistency meal plan with aim for 30- 45 grams of carbohydrates at meals and 15 grams at snack to reduce A1c. Total energy intake of 1,255 calories per day for 1 lb wt loss per week.  Heart healthy meal plan with a low saturated fat intake to <5 -6 % of energy (less than 8 grams of saturated fat per day), reduced intake of added sugars (<10% of total energy), 25 grams of fiber with intake of viscous fiber to 5 g/day to 10 g/day, plant sterols/stanols to 2 g/day, 1.1 gram of omega three fatty acids to reduce LDL and TG levels. 1,500 mg sodium restriction per day.     Nutrition Recommendations:  Via teach back method patient verbalized understanding of the following topics:  1) A hunger awareness meditation and exercise were given today. Use the hunger scale to help recognize and respond to a moderate hunger level, observe when food is consumed without the presence of hunger, and recognize when we have become too hungry. A hunger meditation was also sent with aim to listen to this meditation six days a week to help increase mindfulness to hunger cues.      Educational Handouts: Hunger awareness meditation and  exercise   CHO counting nutrition guide, Plate Method, General meditation of the breath, Reset breath,    Next session: raisin or taste satiety     Sandra Ortega, MS, RDN, LD, FAITH   Advanced Practice Clinical Dietitian  [email or web address]  Schedule line [phone]  Direct line [phone]     Readiness to Change : Good  Level of Understanding : Good  Anticipated Compliant : Good

## 2024-10-30 ENCOUNTER — APPOINTMENT (OUTPATIENT)
Dept: SURGERY | Facility: CLINIC | Age: 54
End: 2024-10-30
Payer: COMMERCIAL

## 2024-11-22 ENCOUNTER — TELEMEDICINE CLINICAL SUPPORT (OUTPATIENT)
Dept: NUTRITION | Facility: CLINIC | Age: 54
End: 2024-11-22
Payer: COMMERCIAL

## 2024-11-22 DIAGNOSIS — E11.9 TYPE 2 DIABETES MELLITUS WITHOUT COMPLICATION, UNSPECIFIED WHETHER LONG TERM INSULIN USE (MULTI): Primary | ICD-10-CM

## 2024-11-22 DIAGNOSIS — L60.9 NAIL ABNORMALITY: Primary | ICD-10-CM

## 2024-11-22 PROCEDURE — 97803 MED NUTRITION INDIV SUBSEQ: CPT | Mod: 95 | Performed by: DIETITIAN, REGISTERED

## 2024-11-22 NOTE — PROGRESS NOTES
Reason for Nutrition Visit:  Pt is a 54 y.o. female being seen at [facility]. Referring provider is Beba Richard DO , effective date is 6/18/24.     1. Type 2 diabetes mellitus without complication, unspecified whether long term insulin use (Multi)              Past Medical Hx:  Patient Active Problem List   Diagnosis    Essential hypertension    YAZMIN (generalized anxiety disorder)    Mixed hyperlipidemia    GERD (gastroesophageal reflux disease)    Type 2 diabetes mellitus without complication (Multi)    Arthralgia    Dermoid cyst of conjunctiva    Fatigue    Other disorders of intestinal carbohydrate absorption    Umbilical hernia    Vitamin B12 deficiency    Vitamin D deficiency    Ventral incisional hernia    PMB (postmenopausal bleeding)        Current Outpatient Medications:     ALPRAZolam (Xanax) 0.25 mg tablet, Take 1 tablet (0.25 mg) by mouth 3 times a day as needed for anxiety., Disp: 6 tablet, Rfl: 0    cholecalciferol (Vitamin D3) 25 MCG (1000 UT) capsule, Take 1 capsule (25 mcg) by mouth once daily., Disp: , Rfl:     CINNAMON BARK ORAL, Take 2 capsules by mouth once daily., Disp: , Rfl:     escitalopram (Lexapro) 20 mg tablet, Take 1 tablet (20 mg) by mouth once daily., Disp: 90 tablet, Rfl: 0    lancets (OneTouch Delica Plus Lancet) 33 gauge misc, USE AS DIRECTED three times a day, Disp: 100 each, Rfl: 3    loratadine (Claritin) 10 mg tablet, Take 1 tablet (10 mg) by mouth once daily., Disp: , Rfl:     melatonin 3 mg tablet, Take 1 tablet (3 mg) by mouth as needed at bedtime for sleep., Disp: , Rfl:     multivitamin tablet, Take 1 tablet by mouth once daily., Disp: , Rfl:     olmesartan (BENIcar) 40 mg tablet, TAKE 1 TABLET BY MOUTH EVERY DAY, Disp: 90 tablet, Rfl: 1    omega 3-dha-epa-fish oil (Fish OiL) 1,000 mg (120 mg-180 mg) capsule, Take 2 capsules (2,000 mg) by mouth 2 times a day., Disp: , Rfl:     OneTouch Ultra Test strip, USE AS DIRECTED 3 TIMES A DAY, Disp: 100 strip, Rfl: 1    pantoprazole  (ProtoNix) 40 mg EC tablet, TAKE 1 TABLET (40 MG) BY MOUTH DAILY DO NOT CRUSH, CHEW, ORSPLIT, Disp: 90 tablet, Rfl: 1    rosuvastatin (Crestor) 20 mg tablet, TAKE 1 TABLET BY MOUTH EVERY DAY, Disp: 90 tablet, Rfl: 3    semaglutide (OZEMPIC) 1 mg/dose (4 mg/3 mL) pen injector, Inject 1 mg under the skin 1 (one) time per week., Disp: 3 mL, Rfl: 5    Current Facility-Administered Medications:     lidocaine-epinephrine (Xylocaine W/EPI) 1 %-1:100,000 injection 10 mL, 10 mL, injection, Once, Opal Corbin MD     Anthropometrics:      Weight change:    Significant Weight Change: No  05/2024 Weight: 189 lbs  02/27/24 Weight:188 lbs 4.4 ounces.   11/2023 Weight 182 lbs 1.6 ounces  09/2023 Weight 190 lbs.   10/22/24 Weight 189 lbs   11/2024 Weight: 188 lbs     Lab Results   Component Value Date    HGBA1C 5.8 (H) 05/30/2024    CHOL 109 12/29/2023    LDLF - 09/06/2023    TRIG 208 (H) 12/29/2023    HDL 33.7 12/29/2023        Chemistry    Lab Results   Component Value Date/Time     05/06/2024 1400    K 4.5 05/06/2024 1400     05/06/2024 1400    CO2 27 05/06/2024 1400    BUN 20 05/06/2024 1400    CREATININE 0.69 05/06/2024 1400    Lab Results   Component Value Date/Time    CALCIUM 10.2 05/06/2024 1400    ALKPHOS 56 01/04/2024 0837    AST 17 01/04/2024 0837    ALT 20 01/04/2024 0837    BILITOT 0.7 01/04/2024 0837           Food and Nutrition Hx:  Pt has been using the plate method. She has not really been CHO counting.   She has been using a glucometer to test blood glucose about 130 mg/dl. She was placed on Ozempic and this has been increased. A1c was 8.9% and now it is at 5.9% and 5.8%. TG was at 416 to 208 mg/dl.   Pt feels good. Her body composition is changing and she is wearing smaller pants.    Pt had a follow-up appointment. She has been more mindful of when she is hungry. She no longer goes a long period of time without eating to get too hungry.   FBG has been 109 lbs. BP has been good. She has a personal   who has been working out. She has been seeing body composition changes. Waist line has decreased.     Pt is waking up until 10:00 am. She is trying to eat meals per day.     24 Diet Recall:  Meal 1: Breakfast is at 10:00 to include protein oats made with 0.5 -1 cup of cooked protein oats, 1 T of adria seeds, almond milk, and protein powder and 1 T of flaxseed and sometime 0.5 banana or prune or berries(kcal 400- 500 CHO 30- 45, protein 35)   Meal 2: Lunch is consumed at 2:00 pm to include a salad to include 3- 4 ounces of chicken, 2 cups of vegetables such as cucumber, tomatoes, 1 T of cranberries with low calorie marinade (kcal 300, CHO 20, protein 28)    Meal 3: Dinner is at 6:00 and she will consume 4 ounces of protein with breading, 2 cups of green beans, or carrots  or broccoli and 1 cup of pasta with oil or pasta sauce (kcal 300, CHO 25, protein 28)   Snacks: cottage and fruit   Beverages: 60 ounces of water per day. She occasionally drinks Crystal Light.   Total protein is 60- 90 grams.     Allergies: None  Intolerance: None  Appetite: Good  Intake: >75%  GI Symptoms : reflux Frequency: infrequent  Swallowing Difficulty: No problems with swallowing  Dentition : own    Types of Activities: Walking and Gym Membership  Duration: 90 minutes*  3 times a week at the gym at moderate intensity to include 60 minutes and 30 minutes of cardio. She also walking for 30 minutes once a week.  Total minutes per week is now 300 minutes per week  from 240 minutes per week.     Sleep duration/quality : 5-6 hours and disrupted sleep. Pt has been sleeping better.   Sleep disorders: poor sleep hygiene    Supplements: Vitamin D and Fish Oil daily. She is not taking a complete MVI.     Feelings of Hunger?: Yes and will eat. Sore belly.       Physical Feeling: Physically full. 5-8 ad defined below     Least full-a little hunger or an absence of hunger   Moderate 5 - fullness sensation in the stomach  8-9 feel full, food is at  the top of the stomach, belching, regurgitation, difficulty with movement, stomach feels too stretched     Most Full-10- feeling sick     Binging: Never  Cravings: None  Energy Levels: Stable    Food Preparation: Patient  Cooking Skills/Barriers: None reported  Grocery Shopping: Patient    Nutrition Focused Physical Exam:    Performed/Deferred: Deferred due to be being virtual visit    Nutrition Focused Physical Exam:        Malnutrition Present: No    Estimated Energy Needs:    Weight Maintanence: 1,755 kcal/day  Weight Loss Needs: 1,255 kcal/day  [place] (REE x 1.2-1.3) - 500 calories per day for wt loss.     Nutrition Diagnosis:    Diagnosis Statement 1:  Diagnosis Status: Ongoing/Improving A1c now is at 5.9%.   Food- and nutrition-related knowledge deficit related to how to eat for diabetes as current A1c is at 8.9% as evidenced by reports by pt of the need to learn.     Diagnosis Statement 2:  Diagnosis: Improving   Excessive carbohydrate intake related to large portion of carbohydrate at some meals  as evidenced by CHO intake at meals can be 70+ grams or more .     Diagnosis Statement 3:   Diagnosis: Improving.   Physical inactivity related to sedentary lifestyle  as evidenced by reported PAL level of 1.2 .     Nutrition Interventions:  Medical nutrition therapy was given for HLD and diabetes.   Nutrition Counseling: CBT  Coordination of Care: None    Nutrition Goals:  carbohydrate consistency meal plan with aim for 30- 45 grams of carbohydrates at meals and 15 grams at snack to reduce A1c. Total energy intake of 1,255 calories per day for 1 lb wt loss per week.  Heart healthy meal plan with a low saturated fat intake to <5 -6 % of energy (less than 8 grams of saturated fat per day), reduced intake of added sugars (<10% of total energy), 25 grams of fiber with intake of viscous fiber to 5 g/day to 10 g/day, plant sterols/stanols to 2 g/day, 1.1 gram of omega three fatty acids to reduce LDL and TG  levels. 1,500 mg sodium restriction per day.     Nutrition Recommendations:  Via teach back method patient verbalized understanding of the following topics:  1) A physical fullness practice was performed today to assess the different ways physical fullness may be felt. Use the scale to help guide when physical cues may suggest the body has been nourished. One may identify physical fullness as a sensation of food in the belly or the belly is no longer hungry. Others may identify overeating by having a too full belly with need to explore with curiosity and compassion as what influences this. A fullness satiety meditation is attached. Continue to set a regular time to meditate 6 days a week to now help focus on fullness satiety cues.    Least full-a little hunger or an absence of hunger   Moderate 5 - fullness sensation in the stomach  8-9 feel full, food is at the top of the stomach, belching, regurgitation, difficulty with movement, stomach feels too stretched     Most Full-10- feeling sick     Educational Handouts: Physical Fullness Exercise   CHO counting nutrition guide, Plate Method, General meditation of the breath, Reset breath,  hunger awareness meditation   Next session: Taste Satiety-bring 3 pieces of chocolate     Sandra Ortega, MS, RDN, LD, FAITH, [id number]-P  Advanced Practice Clinical Dietitian   Mindfulness-Based Eating Awareness Training Practitioner  [facility]   [facility] [place]   [email or web address]  Scheduling Line [phone]  Direct Line [phone]    Readiness to Change : Good  Level of Understanding : Good  Anticipated Compliant : Good

## 2024-11-26 ASSESSMENT — PROMIS GLOBAL HEALTH SCALE
RATE_MENTAL_HEALTH: VERY GOOD
RATE_SOCIAL_SATISFACTION: EXCELLENT
CARRYOUT_PHYSICAL_ACTIVITIES: COMPLETELY
RATE_QUALITY_OF_LIFE: EXCELLENT
CARRYOUT_SOCIAL_ACTIVITIES: VERY GOOD
EMOTIONAL_PROBLEMS: SOMETIMES
RATE_PHYSICAL_HEALTH: VERY GOOD
RATE_GENERAL_HEALTH: VERY GOOD
RATE_AVERAGE_PAIN: 1
RATE_AVERAGE_FATIGUE: MILD
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
## 2024-11-27 ENCOUNTER — OFFICE VISIT (OUTPATIENT)
Dept: PRIMARY CARE | Facility: CLINIC | Age: 54
End: 2024-11-27
Payer: COMMERCIAL

## 2024-11-27 ENCOUNTER — LAB (OUTPATIENT)
Dept: LAB | Facility: LAB | Age: 54
End: 2024-11-27
Payer: COMMERCIAL

## 2024-11-27 VITALS
SYSTOLIC BLOOD PRESSURE: 129 MMHG | BODY MASS INDEX: 31.65 KG/M2 | TEMPERATURE: 97.8 F | DIASTOLIC BLOOD PRESSURE: 80 MMHG | WEIGHT: 190.2 LBS | HEART RATE: 96 BPM

## 2024-11-27 DIAGNOSIS — E53.8 VITAMIN B12 DEFICIENCY: ICD-10-CM

## 2024-11-27 DIAGNOSIS — N95.0 PMB (POSTMENOPAUSAL BLEEDING): ICD-10-CM

## 2024-11-27 DIAGNOSIS — K21.9 GASTROESOPHAGEAL REFLUX DISEASE, UNSPECIFIED WHETHER ESOPHAGITIS PRESENT: ICD-10-CM

## 2024-11-27 DIAGNOSIS — E11.9 TYPE 2 DIABETES MELLITUS WITHOUT COMPLICATION, UNSPECIFIED WHETHER LONG TERM INSULIN USE (MULTI): ICD-10-CM

## 2024-11-27 DIAGNOSIS — Z00.00 ENCOUNTER FOR ROUTINE ADULT HEALTH EXAMINATION WITHOUT ABNORMAL FINDINGS: ICD-10-CM

## 2024-11-27 DIAGNOSIS — F41.0 PANIC: ICD-10-CM

## 2024-11-27 DIAGNOSIS — Z00.00 ENCOUNTER FOR PREVENTATIVE ADULT HEALTH CARE EXAMINATION: Primary | ICD-10-CM

## 2024-11-27 DIAGNOSIS — Z00.00 ENCOUNTER FOR PREVENTATIVE ADULT HEALTH CARE EXAMINATION: ICD-10-CM

## 2024-11-27 DIAGNOSIS — F41.1 GAD (GENERALIZED ANXIETY DISORDER): ICD-10-CM

## 2024-11-27 DIAGNOSIS — I10 ESSENTIAL HYPERTENSION: ICD-10-CM

## 2024-11-27 DIAGNOSIS — E78.2 MIXED HYPERLIPIDEMIA: ICD-10-CM

## 2024-11-27 PROBLEM — M25.50 ARTHRALGIA: Status: RESOLVED | Noted: 2023-09-14 | Resolved: 2024-11-27

## 2024-11-27 PROBLEM — R10.11 RIGHT UPPER QUADRANT PAIN: Status: ACTIVE | Noted: 2024-11-27

## 2024-11-27 LAB
25(OH)D3 SERPL-MCNC: 34 NG/ML (ref 30–100)
ALBUMIN SERPL BCP-MCNC: 4.7 G/DL (ref 3.4–5)
ALP SERPL-CCNC: 56 U/L (ref 33–110)
ALT SERPL W P-5'-P-CCNC: 16 U/L (ref 7–45)
ANION GAP SERPL CALC-SCNC: 13 MMOL/L (ref 10–20)
AST SERPL W P-5'-P-CCNC: 20 U/L (ref 9–39)
BASOPHILS # BLD AUTO: 0.04 X10*3/UL (ref 0–0.1)
BASOPHILS NFR BLD AUTO: 0.5 %
BILIRUB SERPL-MCNC: 0.6 MG/DL (ref 0–1.2)
BUN SERPL-MCNC: 15 MG/DL (ref 6–23)
CALCIUM SERPL-MCNC: 9.7 MG/DL (ref 8.6–10.6)
CHLORIDE SERPL-SCNC: 102 MMOL/L (ref 98–107)
CHOLEST SERPL-MCNC: 128 MG/DL (ref 0–199)
CHOLESTEROL/HDL RATIO: 3.9
CO2 SERPL-SCNC: 29 MMOL/L (ref 21–32)
CREAT SERPL-MCNC: 0.8 MG/DL (ref 0.5–1.05)
CREAT UR-MCNC: 175 MG/DL (ref 20–320)
EGFRCR SERPLBLD CKD-EPI 2021: 87 ML/MIN/1.73M*2
EOSINOPHIL # BLD AUTO: 0.06 X10*3/UL (ref 0–0.7)
EOSINOPHIL NFR BLD AUTO: 0.7 %
ERYTHROCYTE [DISTWIDTH] IN BLOOD BY AUTOMATED COUNT: 12.4 % (ref 11.5–14.5)
EST. AVERAGE GLUCOSE BLD GHB EST-MCNC: 126 MG/DL
GLUCOSE SERPL-MCNC: 134 MG/DL (ref 74–99)
HBA1C MFR BLD: 6 %
HCT VFR BLD AUTO: 43.1 % (ref 36–46)
HDLC SERPL-MCNC: 32.6 MG/DL
HGB BLD-MCNC: 14.4 G/DL (ref 12–16)
IMM GRANULOCYTES # BLD AUTO: 0.06 X10*3/UL (ref 0–0.7)
IMM GRANULOCYTES NFR BLD AUTO: 0.7 % (ref 0–0.9)
LDLC SERPL CALC-MCNC: ABNORMAL MG/DL
LYMPHOCYTES # BLD AUTO: 2.84 X10*3/UL (ref 1.2–4.8)
LYMPHOCYTES NFR BLD AUTO: 35.1 %
MCH RBC QN AUTO: 30.1 PG (ref 26–34)
MCHC RBC AUTO-ENTMCNC: 33.4 G/DL (ref 32–36)
MCV RBC AUTO: 90 FL (ref 80–100)
MICROALBUMIN UR-MCNC: <7 MG/L
MICROALBUMIN/CREAT UR: NORMAL MG/G{CREAT}
MONOCYTES # BLD AUTO: 0.41 X10*3/UL (ref 0.1–1)
MONOCYTES NFR BLD AUTO: 5.1 %
NEUTROPHILS # BLD AUTO: 4.68 X10*3/UL (ref 1.2–7.7)
NEUTROPHILS NFR BLD AUTO: 57.9 %
NON HDL CHOLESTEROL: 95 MG/DL (ref 0–149)
NRBC BLD-RTO: 0 /100 WBCS (ref 0–0)
PLATELET # BLD AUTO: 168 X10*3/UL (ref 150–450)
POTASSIUM SERPL-SCNC: 4.4 MMOL/L (ref 3.5–5.3)
PROT SERPL-MCNC: 7.1 G/DL (ref 6.4–8.2)
RBC # BLD AUTO: 4.78 X10*6/UL (ref 4–5.2)
SODIUM SERPL-SCNC: 140 MMOL/L (ref 136–145)
TRIGL SERPL-MCNC: 402 MG/DL (ref 0–149)
TSH SERPL-ACNC: 1.15 MIU/L (ref 0.44–3.98)
VIT B12 SERPL-MCNC: 722 PG/ML (ref 211–911)
VLDL: ABNORMAL
WBC # BLD AUTO: 8.1 X10*3/UL (ref 4.4–11.3)

## 2024-11-27 PROCEDURE — 82570 ASSAY OF URINE CREATININE: CPT

## 2024-11-27 PROCEDURE — 36415 COLL VENOUS BLD VENIPUNCTURE: CPT

## 2024-11-27 PROCEDURE — 84443 ASSAY THYROID STIM HORMONE: CPT

## 2024-11-27 PROCEDURE — 80053 COMPREHEN METABOLIC PANEL: CPT

## 2024-11-27 PROCEDURE — 80061 LIPID PANEL: CPT

## 2024-11-27 PROCEDURE — 1036F TOBACCO NON-USER: CPT | Performed by: INTERNAL MEDICINE

## 2024-11-27 PROCEDURE — 82306 VITAMIN D 25 HYDROXY: CPT

## 2024-11-27 PROCEDURE — 83036 HEMOGLOBIN GLYCOSYLATED A1C: CPT

## 2024-11-27 PROCEDURE — 99396 PREV VISIT EST AGE 40-64: CPT | Performed by: INTERNAL MEDICINE

## 2024-11-27 PROCEDURE — 85025 COMPLETE CBC W/AUTO DIFF WBC: CPT

## 2024-11-27 PROCEDURE — 4010F ACE/ARB THERAPY RXD/TAKEN: CPT | Performed by: INTERNAL MEDICINE

## 2024-11-27 PROCEDURE — 3074F SYST BP LT 130 MM HG: CPT | Performed by: INTERNAL MEDICINE

## 2024-11-27 PROCEDURE — 82043 UR ALBUMIN QUANTITATIVE: CPT

## 2024-11-27 PROCEDURE — 82607 VITAMIN B-12: CPT

## 2024-11-27 PROCEDURE — 3044F HG A1C LEVEL LT 7.0%: CPT | Performed by: INTERNAL MEDICINE

## 2024-11-27 PROCEDURE — 3079F DIAST BP 80-89 MM HG: CPT | Performed by: INTERNAL MEDICINE

## 2024-11-27 RX ORDER — ALPRAZOLAM 0.25 MG/1
0.25 TABLET ORAL 3 TIMES DAILY PRN
Qty: 6 TABLET | Refills: 0 | Status: SHIPPED | OUTPATIENT
Start: 2024-11-27 | End: 2025-07-25

## 2024-11-27 ASSESSMENT — PATIENT HEALTH QUESTIONNAIRE - PHQ9
1. LITTLE INTEREST OR PLEASURE IN DOING THINGS: NOT AT ALL
2. FEELING DOWN, DEPRESSED OR HOPELESS: NOT AT ALL
SUM OF ALL RESPONSES TO PHQ9 QUESTIONS 1 & 2: 0

## 2024-11-27 NOTE — ASSESSMENT & PLAN NOTE
On pantoprazole 40 mg, attempt to reduce dosage to 20 mg if controlled.  EGD in the past unrevealing in 2012  Check B12 levels, low in the past  Orders:    Vitamin B12; Future

## 2024-11-27 NOTE — PATIENT INSTRUCTIONS
[place],   Labs including bloodwork and/or urine is ordered today. The lab can be found on this floor (2nd floor) next to the pharmacy across from the elevators.    Schedule ophthalmology  Increase ozempic to 2mg    Reflux - try half a tablet of pantoprazole and see if it controls your symptoms.  Dermatology   KEEP UP THE GOOD WORK!!!!!     See you in 6 months minimal

## 2024-11-27 NOTE — ASSESSMENT & PLAN NOTE
Last A1c 5.8%, on Ozempic 1 mg interested in up titration to 2  Continues to follow with nutritionist  Due for ophtho, on statin, screen for albuminuria, no neuropathy  Vaccines up-to-date  Continues very healthy lifestyle  Orders:    Albumin-Creatinine Ratio, Urine Random; Future    semaglutide 2 mg/dose (8 mg/3 mL) pen injector; Inject 2 mg under the skin 1 (one) time per week.    Follow Up In [facility] - PCP - Established; Future

## 2024-11-27 NOTE — ASSESSMENT & PLAN NOTE
Uncertain etiology, no alarm symptoms has been going on for the past 12 years, status post cholecystectomy  Possible acnes, though not fully consistent with this.  Consider repeat imaging, recommend consideration for GI consultation

## 2024-12-10 DIAGNOSIS — E11.9 TYPE 2 DIABETES MELLITUS WITHOUT COMPLICATION, UNSPECIFIED WHETHER LONG TERM INSULIN USE (MULTI): ICD-10-CM
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
## 2024-12-13 RX ORDER — SEMAGLUTIDE 1.34 MG/ML
1 INJECTION, SOLUTION SUBCUTANEOUS
Refills: 5 | OUTPATIENT
Start: 2024-12-15

## 2024-12-18 ENCOUNTER — OFFICE VISIT (OUTPATIENT)
Dept: SURGERY | Facility: CLINIC | Age: 54
End: 2024-12-18
Payer: COMMERCIAL

## 2024-12-18 VITALS
SYSTOLIC BLOOD PRESSURE: 120 MMHG | WEIGHT: 186 LBS | OXYGEN SATURATION: 100 % | BODY MASS INDEX: 30.99 KG/M2 | TEMPERATURE: 97.2 F | HEIGHT: 65 IN | DIASTOLIC BLOOD PRESSURE: 80 MMHG | HEART RATE: 77 BPM

## 2024-12-18 DIAGNOSIS — K64.4 SKIN TAG OF ANUS: Primary | ICD-10-CM

## 2024-12-18 PROCEDURE — 1036F TOBACCO NON-USER: CPT | Performed by: COLON & RECTAL SURGERY

## 2024-12-18 PROCEDURE — 3079F DIAST BP 80-89 MM HG: CPT | Performed by: COLON & RECTAL SURGERY

## 2024-12-18 PROCEDURE — 46600 DIAGNOSTIC ANOSCOPY SPX: CPT | Performed by: COLON & RECTAL SURGERY

## 2024-12-18 PROCEDURE — 3044F HG A1C LEVEL LT 7.0%: CPT | Performed by: COLON & RECTAL SURGERY

## 2024-12-18 PROCEDURE — 3062F POS MACROALBUMINURIA REV: CPT | Performed by: COLON & RECTAL SURGERY

## 2024-12-18 PROCEDURE — 3074F SYST BP LT 130 MM HG: CPT | Performed by: COLON & RECTAL SURGERY

## 2024-12-18 PROCEDURE — 11420 EXC H-F-NK-SP B9+MARG 0.5/<: CPT | Performed by: COLON & RECTAL SURGERY

## 2024-12-18 PROCEDURE — 99213 OFFICE O/P EST LOW 20 MIN: CPT | Performed by: COLON & RECTAL SURGERY

## 2024-12-18 PROCEDURE — 4010F ACE/ARB THERAPY RXD/TAKEN: CPT | Performed by: COLON & RECTAL SURGERY

## 2024-12-18 PROCEDURE — 3008F BODY MASS INDEX DOCD: CPT | Performed by: COLON & RECTAL SURGERY

## 2024-12-18 RX ORDER — LIDOCAINE HYDROCHLORIDE AND EPINEPHRINE 10; 10 UG/ML; MG/ML
10 INJECTION, SOLUTION INFILTRATION; PERINEURAL ONCE
Status: CANCELLED | OUTPATIENT
Start: 2024-12-18 | End: 2024-12-18

## 2024-12-18 ASSESSMENT — PAIN SCALES - GENERAL: PAINLEVEL_OUTOF10: 0-NO PAIN

## 2024-12-19 ENCOUNTER — TELEMEDICINE CLINICAL SUPPORT (OUTPATIENT)
Dept: NUTRITION | Facility: CLINIC | Age: 54
End: 2024-12-19
Payer: COMMERCIAL

## 2024-12-19 ENCOUNTER — APPOINTMENT (OUTPATIENT)
Dept: PRIMARY CARE | Facility: CLINIC | Age: 54
End: 2024-12-19
Payer: COMMERCIAL

## 2024-12-19 VITALS — BODY MASS INDEX: 30.99 KG/M2 | WEIGHT: 186 LBS | HEIGHT: 65 IN

## 2024-12-19 DIAGNOSIS — E11.9 TYPE 2 DIABETES MELLITUS WITHOUT COMPLICATION, UNSPECIFIED WHETHER LONG TERM INSULIN USE (MULTI): Primary | ICD-10-CM

## 2024-12-19 PROCEDURE — 97803 MED NUTRITION INDIV SUBSEQ: CPT | Performed by: DIETITIAN, REGISTERED

## 2024-12-19 NOTE — PROGRESS NOTES
Reason for Nutrition Visit:  Pt is a 54 y.o. female being seen at [facility]. Referring provider is Beba Richard DO , effective date is 6/18/24.     1. Type 2 diabetes mellitus without complication, unspecified whether long term insulin use (Multi)           Past Medical Hx:  Patient Active Problem List   Diagnosis    Essential hypertension    YAZMIN (generalized anxiety disorder)    Mixed hyperlipidemia    GERD (gastroesophageal reflux disease)    Type 2 diabetes mellitus without complication (Multi)    Dermoid cyst of conjunctiva    Fatigue    Other disorders of intestinal carbohydrate absorption    Umbilical hernia    Vitamin B12 deficiency    Vitamin D deficiency    Ventral incisional hernia    PMB (postmenopausal bleeding)    Right upper quadrant pain        Current Outpatient Medications:     ALPRAZolam (Xanax) 0.25 mg tablet, Take 1 tablet (0.25 mg) by mouth 3 times a day as needed for anxiety., Disp: 6 tablet, Rfl: 0    cholecalciferol (Vitamin D3) 25 MCG (1000 UT) capsule, Take 1 capsule (25 mcg) by mouth once daily., Disp: , Rfl:     CINNAMON BARK ORAL, Take 2 capsules by mouth once daily., Disp: , Rfl:     escitalopram (Lexapro) 20 mg tablet, Take 1 tablet (20 mg) by mouth once daily., Disp: 90 tablet, Rfl: 0    lancets (OneTouch Delica Plus Lancet) 33 gauge misc, USE AS DIRECTED three times a day, Disp: 100 each, Rfl: 3    loratadine (Claritin) 10 mg tablet, Take 1 tablet (10 mg) by mouth once daily., Disp: , Rfl:     melatonin 3 mg tablet, Take 1 tablet (3 mg) by mouth as needed at bedtime for sleep., Disp: , Rfl:     multivitamin tablet, Take 1 tablet by mouth once daily., Disp: , Rfl:     olmesartan (BENIcar) 40 mg tablet, TAKE 1 TABLET BY MOUTH EVERY DAY, Disp: 90 tablet, Rfl: 1    omega 3-dha-epa-fish oil (Fish OiL) 1,000 mg (120 mg-180 mg) capsule, Take 2 capsules (2,000 mg) by mouth 2 times a day., Disp: , Rfl:     OneTouch Ultra Test strip, USE AS DIRECTED 3 TIMES A DAY, Disp: 100 strip, Rfl: 1     "pantoprazole (ProtoNix) 40 mg EC tablet, TAKE 1 TABLET (40 MG) BY MOUTH DAILY DO NOT CRUSH, CHEW, ORSPLIT, Disp: 90 tablet, Rfl: 1    rosuvastatin (Crestor) 20 mg tablet, TAKE 1 TABLET BY MOUTH EVERY DAY, Disp: 90 tablet, Rfl: 3    semaglutide 2 mg/dose (8 mg/3 mL) pen injector, Inject 2 mg under the skin 1 (one) time per week., Disp: 3 mL, Rfl: 5    Current Facility-Administered Medications:     lidocaine-epinephrine (Xylocaine W/EPI) 1 %-1:100,000 injection 10 mL, 10 mL, injection, Once, Opal Corbin MD     Anthropometrics:  Anthropometrics  Height: 165.1 cm (5' 5\")  Weight: 84.4 kg (186 lb)  BMI (Calculated): 30.95   Weight change:    Significant Weight Change: No  05/2024 Weight: 189 lbs  02/27/24 Weight:188 lbs 4.4 ounces.   11/2023 Weight 182 lbs 1.6 ounces  09/2023 Weight 190 lbs.   10/22/24 Weight 189 lbs   11/2024 Weight: 188 lbs   12/2024 Weight: 186 lbs     Lab Results   Component Value Date    HGBA1C 6.0 (H) 11/27/2024    CHOL 128 11/27/2024    LDLF - 09/06/2023    TRIG 402 (H) 11/27/2024    HDL 32.6 11/27/2024        Chemistry    Lab Results   Component Value Date/Time     11/27/2024 0814    K 4.4 11/27/2024 0814     11/27/2024 0814    CO2 29 11/27/2024 0814    BUN 15 11/27/2024 0814    CREATININE 0.80 11/27/2024 0814    Lab Results   Component Value Date/Time    CALCIUM 9.7 11/27/2024 0814    ALKPHOS 56 11/27/2024 0814    AST 20 11/27/2024 0814    ALT 16 11/27/2024 0814    BILITOT 0.6 11/27/2024 0814           Food and Nutrition Hx:  Pt has been using the plate method. She has not really been CHO counting.   She has been using a glucometer to test blood glucose about 130 mg/dl. She was placed on Ozempic and this has been increased. A1c was 8.9% and now it is at 5.9% and 5.8%. TG was at 416 to 208 mg/dl.   Pt feels good. Her body composition is changing and she is wearing smaller pants.    Pt had a follow-up appointment. She has been more mindful of when she is hungry. She no longer " goes a long period of time without eating to get too hungry.   FBG has been 109 lbs. BP has been good. She has a  who has been working out. She has been seeing body composition changes. Waist line has decreased.     Pt is waking up until 10:00 am. She is trying to eat meals per day.     24 Diet Recall:  Meal 1: Breakfast is at 10:00 to include protein oats made with 0.5 -1 cup of cooked protein oats, 1 T of adria seeds, almond milk, and protein powder and 1 T of flaxseed and sometime 0.5 banana or prune or berries(kcal 400- 500 CHO 30- 45, protein 35)   Meal 2: Lunch is consumed at 2:00 pm to include a salad to include 3- 4 ounces of chicken, 2 cups of vegetables such as cucumber, tomatoes, 1 T of cranberries with low calorie marinade (kcal 300, CHO 20, protein 28)    Meal 3: Dinner is at 6:00 and she will consume 4 ounces of protein with breading, 2 cups of green beans, or carrots  or broccoli and 1 cup of pasta with oil or pasta sauce (kcal 300, CHO 25, protein 28)   Snacks: cottage and fruit   Beverages: 60 ounces of water per day. She occasionally drinks Crystal Light.   Total protein is 60- 90 grams.     Allergies: None  Intolerance: None  Appetite: Good  Intake: >75%  GI Symptoms : reflux Frequency: infrequent  Swallowing Difficulty: No problems with swallowing  Dentition : own    Types of Activities: Walking and Gym Membership  Duration: 90 minutes*  3 times a week at the gym at moderate intensity to include 60 minutes and 30 minutes of cardio. She also walking for 30 minutes once a week.  Total minutes per week is now 300 minutes per week  from 240 minutes per week.     Sleep duration/quality : 5-6 hours and disrupted sleep. Pt has been sleeping better.   Sleep disorders: poor sleep hygiene    Supplements: Vitamin D and Fish Oil daily. She is not taking a complete MVI.     Feelings of Hunger?: Yes and will eat. Sore belly.       Physical Feeling: Physically full. 5-8 ad defined below     Least  full-a little hunger or an absence of hunger   Moderate 5 - fullness sensation in the stomach  8-9 feel full, food is at the top of the stomach, belching, regurgitation, difficulty with movement, stomach feels too stretched     Most Full-10- feeling sick     Binging: Never  Cravings: None  Energy Levels: Stable    Food Preparation: Patient  Cooking Skills/Barriers: None reported  Grocery Shopping: Patient    Nutrition Focused Physical Exam:    Performed/Deferred: Deferred due to be being virtual visit    Nutrition Focused Physical Exam:        Malnutrition Present: No    Estimated Energy Needs:    Weight Maintanence: 1,755 kcal/day  Weight Loss Needs: 1,255 kcal/day  [place] (REE x 1.2-1.3) - 500 calories per day for wt loss.     Nutrition Diagnosis:    Diagnosis Statement 1:  Diagnosis Status: Ongoing/Improving A1c now is at 5.9%.   Food- and nutrition-related knowledge deficit related to how to eat for diabetes as current A1c is at 8.9% as evidenced by reports by pt of the need to learn.     Diagnosis Statement 2:  Diagnosis: Improving   Excessive carbohydrate intake related to large portion of carbohydrate at some meals  as evidenced by CHO intake at meals can be 70+ grams or more     Diagnosis Statement 3:   Diagnosis: Improving.   Physical inactivity related to sedentary lifestyle  as evidenced by reported PAL level of 1.2 .     Nutrition Interventions:  Medical nutrition therapy was given for HLD and diabetes.   Nutrition Counseling: CBT  Coordination of Care: None    Nutrition Goals:  carbohydrate consistency meal plan with aim for 30- 45 grams of carbohydrates at meals and 15 grams at snack to reduce A1c. Total energy intake of 1,255 calories per day for 1 lb wt loss per week.  Heart healthy meal plan with a low saturated fat intake to <5 -6 % of energy (less than 8 grams of saturated fat per day), reduced intake of added sugars (<10% of total energy), 25 grams of fiber with intake of viscous fiber  to 5 g/day to 10 g/day, plant sterols/stanols to 2 g/day, 1.1 gram of omega three fatty acids to reduce LDL and TG levels. 1,500 mg sodium restriction per day.     Nutrition Recommendations:  Via teach back method patient verbalized understanding of the following topics:    Taste satiety exercise was performed today.      Developing inner wisdom now shifts to exploring how the body lets us know that we may have eaten enough. The first feedback for satiety (“eating enough”) comes from our taste buds. Even with highly satisfying foods, the taste buds tire quickly. Paying full attention to flavor and texture can help bring greater pleasure from small amounts of food, thereby cultivating your “inner gourmet”     Remember, when we feel hunger, it allows us to taste food more. When we are too hungry, certain foods and or flavors are desired. Processed foods may decrease in taste the more it is consumed; this is taste satiety. Gourmet foods keep the flavor/taste as you eat. Strive to enjoy the food.     Attached is a taste satiety meditation. Consider listening to meditation 5 times a week for the next few weeks.     Practice eating two meals mindfully every day paying special attention to taste experience: let yourself really enjoy the food as you are first eating it, then become aware of taste satisfaction, and then try to stop eating foods if they are no longer as satisfying or enjoyable. Practice becoming a “mindful gourmet”! IF you notice eating past a point of satisfaction, consider why that might have happened (socializing; emotions; boredom; nutritional needs).        Educational Handouts: Taste Satisfaction exercise and meditation  CHO counting nutrition guide, Plate Method, General meditation of the breath, Reset breath,  hunger awareness meditation, Fullness exercise,    Next session: Taste Satiety-bring 3 pieces of chocolate     Sandra Ortega, MS, RDN, LD, FANGREGORIO, MB-EAT-P  Advanced Practice Clinical Dietitian    Mindfulness-Based Eating Awareness Training Practitioner  [facility]   [facility] [place]   [email or web address]  Scheduling Line [phone]  Direct Line [phone]    Readiness to Change : Good  Level of Understanding : Good  Anticipated Compliant : Good

## 2024-12-30 ENCOUNTER — PATIENT MESSAGE (OUTPATIENT)
Dept: SURGERY | Facility: CLINIC | Age: 54
End: 2024-12-30
Payer: COMMERCIAL

## 2025-01-02 DIAGNOSIS — F41.1 GAD (GENERALIZED ANXIETY DISORDER): ICD-10-CM

## 2025-01-02 RX ORDER — ESCITALOPRAM OXALATE 20 MG/1
20 TABLET ORAL DAILY
Qty: 90 TABLET | Refills: 3 | Status: SHIPPED | OUTPATIENT
Start: 2025-01-02

## 2025-01-22 ENCOUNTER — APPOINTMENT (OUTPATIENT)
Dept: NUTRITION | Facility: CLINIC | Age: 55
End: 2025-01-22
Payer: COMMERCIAL

## 2025-02-14 DIAGNOSIS — I10 ESSENTIAL HYPERTENSION: ICD-10-CM

## 2025-02-14 RX ORDER — OLMESARTAN MEDOXOMIL 40 MG/1
40 TABLET ORAL DAILY
Qty: 90 TABLET | Refills: 1 | Status: SHIPPED | OUTPATIENT
Start: 2025-02-14

## 2025-02-15 DIAGNOSIS — K21.9 GASTROESOPHAGEAL REFLUX DISEASE, UNSPECIFIED WHETHER ESOPHAGITIS PRESENT: ICD-10-CM

## 2025-02-17 RX ORDER — PANTOPRAZOLE SODIUM 40 MG/1
40 TABLET, DELAYED RELEASE ORAL DAILY
Qty: 90 TABLET | Refills: 1 | Status: SHIPPED | OUTPATIENT
Start: 2025-02-17

## 2025-02-19 ENCOUNTER — APPOINTMENT (OUTPATIENT)
Dept: NUTRITION | Facility: CLINIC | Age: 55
End: 2025-02-19
Payer: COMMERCIAL

## 2025-02-19 VITALS — WEIGHT: 180 LBS | HEIGHT: 65 IN | BODY MASS INDEX: 29.99 KG/M2

## 2025-02-19 DIAGNOSIS — E11.9 TYPE 2 DIABETES MELLITUS WITHOUT COMPLICATION, UNSPECIFIED WHETHER LONG TERM INSULIN USE (MULTI): ICD-10-CM

## 2025-02-19 PROCEDURE — 97803 MED NUTRITION INDIV SUBSEQ: CPT | Performed by: DIETITIAN, REGISTERED

## 2025-02-19 NOTE — PROGRESS NOTES
Reason for Nutrition Visit:  Pt is a 55 y.o. female being seen at [facility]. Referring provider is Beba Richard DO , effective date is 6/18/24.     1. Type 2 diabetes mellitus without complication, unspecified whether long term insulin use (Multi)  Referral to Nutrition Services         Past Medical Hx:  Patient Active Problem List   Diagnosis    Essential hypertension    YAZMIN (generalized anxiety disorder)    Mixed hyperlipidemia    GERD (gastroesophageal reflux disease)    Type 2 diabetes mellitus without complication (Multi)    Dermoid cyst of conjunctiva    Fatigue    Other disorders of intestinal carbohydrate absorption    Umbilical hernia    Vitamin B12 deficiency    Vitamin D deficiency    Ventral incisional hernia    PMB (postmenopausal bleeding)    Right upper quadrant pain        Current Outpatient Medications:     ALPRAZolam (Xanax) 0.25 mg tablet, Take 1 tablet (0.25 mg) by mouth 3 times a day as needed for anxiety., Disp: 6 tablet, Rfl: 0    cholecalciferol (Vitamin D3) 25 MCG (1000 UT) capsule, Take 1 capsule (25 mcg) by mouth once daily., Disp: , Rfl:     CINNAMON BARK ORAL, Take 2 capsules by mouth once daily., Disp: , Rfl:     escitalopram (Lexapro) 20 mg tablet, TAKE 1 TABLET BY MOUTH EVERY DAY, Disp: 90 tablet, Rfl: 3    lancets (OneTouch Delica Plus Lancet) 33 gauge misc, USE AS DIRECTED three times a day, Disp: 100 each, Rfl: 3    loratadine (Claritin) 10 mg tablet, Take 1 tablet (10 mg) by mouth once daily., Disp: , Rfl:     melatonin 3 mg tablet, Take 1 tablet (3 mg) by mouth as needed at bedtime for sleep., Disp: , Rfl:     multivitamin tablet, Take 1 tablet by mouth once daily., Disp: , Rfl:     olmesartan (BENIcar) 40 mg tablet, TAKE 1 TABLET BY MOUTH EVERY DAY, Disp: 90 tablet, Rfl: 1    omega 3-dha-epa-fish oil (Fish OiL) 1,000 mg (120 mg-180 mg) capsule, Take 2 capsules (2,000 mg) by mouth 2 times a day., Disp: , Rfl:     OneTouch Ultra Test strip, USE AS DIRECTED 3 TIMES A DAY, Disp: 100  "strip, Rfl: 1    pantoprazole (ProtoNix) 40 mg EC tablet, TAKE 1 TABLET (40 MG) BY MOUTH DAILY DO NOT CRUSH, CHEW, ORSPLIT, Disp: 90 tablet, Rfl: 1    rosuvastatin (Crestor) 20 mg tablet, TAKE 1 TABLET BY MOUTH EVERY DAY, Disp: 90 tablet, Rfl: 3    semaglutide 2 mg/dose (8 mg/3 mL) pen injector, Inject 2 mg under the skin 1 (one) time per week., Disp: 3 mL, Rfl: 5    Current Facility-Administered Medications:     lidocaine-epinephrine (Xylocaine W/EPI) 1 %-1:100,000 injection 10 mL, 10 mL, injection, Once, Opal Corbin MD     Anthropometrics:  Anthropometrics  Height: 165.1 cm (5' 5\")  Weight: 81.6 kg (180 lb)  BMI (Calculated): 29.95   Weight change:    Significant Weight Change: No  05/2024 Weight: 189 lbs  02/27/24 Weight:188 lbs 4.4 ounces.   11/2023 Weight 182 lbs 1.6 ounces  09/2023 Weight 190 lbs.   10/22/24 Weight 189 lbs   11/2024 Weight: 188 lbs   12/2024 Weight: 186 lbs   02/2025 Weight: 180 lbs    Lab Results   Component Value Date    HGBA1C 6.0 (H) 11/27/2024    CHOL 128 11/27/2024    LDLF - 09/06/2023    TRIG 402 (H) 11/27/2024    HDL 32.6 11/27/2024        Chemistry    Lab Results   Component Value Date/Time     11/27/2024 0814    K 4.4 11/27/2024 0814     11/27/2024 0814    CO2 29 11/27/2024 0814    BUN 15 11/27/2024 0814    CREATININE 0.80 11/27/2024 0814    Lab Results   Component Value Date/Time    CALCIUM 9.7 11/27/2024 0814    ALKPHOS 56 11/27/2024 0814    AST 20 11/27/2024 0814    ALT 16 11/27/2024 0814    BILITOT 0.6 11/27/2024 0814           Food and Nutrition Hx:  Pt has been using the plate method. She has not really been CHO counting.   She has been using a glucometer to test blood glucose about 130 mg/dl. She was placed on Ozempic and this has been increased. A1c was 8.9% and now it is at 5.9% and 5.8%. TG was at 416 to 208 mg/dl.   Pt feels good. Her body composition is changing and she is wearing smaller pants.    Pt had a follow-up appointment.   FBG has been 109 lbs. " BP has been good.   She has a  who has been working out. She has been seeing body composition changes. Waist line has decreased.   Pt has not been following the meditation. She has been focus on hunger, taste satiety and fullness. She states it is all clicking now. She is learning how to nourish herself. She is selecting foods based on preference and what the body needs.     Pt is waking up until 10:00 am. She is trying to eat meals per day.     24 Diet Recall:  Meal 1: Breakfast is at 10:00 to include protein oats made with 0.5 -1 cup of cooked protein oats, 1 T of adria seeds, almond milk, and protein powder and 1 T of flaxseed and sometime 0.5 banana or prune or berries(kcal 400- 500 CHO 30- 45, protein 35)   Meal 2: Lunch is consumed at 2:00 pm to include a salad to include 3- 4 ounces of chicken, 2 cups of vegetables such as cucumber, tomatoes, 1 T of cranberries with low calorie marinade (kcal 300, CHO 20, protein 28)    Meal 3: Dinner is at 6:00 and she will consume 4 ounces of protein with breading, 2 cups of green beans, or carrots  or broccoli and 1 cup of pasta with oil or pasta sauce (kcal 300, CHO 25, protein 28)   Snacks: cottage and fruit   Beverages: 60 ounces of water per day. She occasionally drinks Crystal Light.   Total protein is 60- 90 grams.     Allergies: None  Intolerance: None  Appetite: Good  Intake: >75%  GI Symptoms : reflux Frequency: infrequent  Swallowing Difficulty: No problems with swallowing  Dentition : own    Types of Activities: Walking and Gym Membership  Duration: 90 minutes*  3 times a week at the gym at moderate intensity to include 60 minutes and 30 minutes of cardio. She also walking for 30 minutes once a week.  Total minutes per week is now 300 minutes per week  from 240 minutes per week.     Sleep duration/quality : 5-6 hours and disrupted sleep. Pt has been sleeping better.   Sleep disorders: poor sleep hygiene    Supplements: Vitamin D and Fish Oil  daily. She is not taking a complete MVI.     Feelings of Hunger?: Yes and will eat. Sore belly.       Physical Feeling: Physically full. 5-8 ad defined below     Least full-a little hunger or an absence of hunger   Moderate 5 - fullness sensation in the stomach  8-9 feel full, food is at the top of the stomach, belching, regurgitation, difficulty with movement, stomach feels too stretched     Most Full-10- feeling sick     Binging: Never  Cravings: None  Energy Levels: Stable    Food Preparation: Patient  Cooking Skills/Barriers: None reported  Grocery Shopping: Patient    Nutrition Focused Physical Exam:    Performed/Deferred: Deferred due to be being virtual visit  Malnutrition Present: No    Estimated Energy Needs:    Weight Maintanence: 1,755 kcal/day  Weight Loss Needs: 1,255 kcal/day  [place] (REE x 1.2-1.3) - 500 calories per day for wt loss.     Nutrition Diagnosis:    Nutrition Diagnosis     Patient has Nutrition Diagnosis Yes   Diagnosis Status (1) Resolved   Nutrition Diagnosis 1 Food and nutrition related knowledge deficit   Related to (1) how to eat for diabetes as current A1c is at 8.9% (A1c is at 6.0%)    As Evidenced by (1) reports by pt of the need to learn.   Additional Nutrition Diagnosis Diagnosis 3   Diagnosis Status (2) Resolved   Nutrition Diagnosis 2 Excessive carbohydrate intake   Related to (2) arge portion of carbohydrate at some meals   As Evidenced by (2) CHO intake at meals can be 70+ grams or more.   Diagnosis Status (3) RESOLVED   Related to (3) sedentary lifestyle   As Evidenced by (3) reported PAL level of 1.2 -1.3.     Next session: add self-monitoring deficit dx    Nutrition Interventions:  Medical nutrition therapy was given for HLD and diabetes.   Nutrition Counseling: CBT  Coordination of Care: None  12/19/24 Discussed referral to psychology if needed to address emotions related to eating triggers.     Nutrition Monitoring and  Evaluation  [id number]  Weight-decreased    Nutrition Goals:  carbohydrate consistency meal plan with aim for 30- 45 grams of carbohydrates at meals and 15 grams at snack to reduce A1c. Total energy intake of 1,255 calories per day for 1 lb wt loss per week.  Heart healthy meal plan with a low saturated fat intake to <5 -6 % of energy (less than 8 grams of saturated fat per day), reduced intake of added sugars (<10% of total energy), 25 grams of fiber with intake of viscous fiber to 5 g/day to 10 g/day, plant sterols/stanols to 2 g/day, 1.1 gram of omega three fatty acids to reduce LDL and TG levels. 1,500 mg sodium restriction per day.     Nutrition Recommendations:  Via teach back method patient verbalized understanding of the following topics:    Keep balance worksheet was given. Complete this worksheet, looking for patterns of eating/movement that you would like to keep a balance. Think about ways you can gradually make changes, either in increasing or decreasing doing something that will be helpful in changing eating or exercise behavior. Consider identifying at least three patterns related to eating or exercise to focus on. Strive to select one goal that’s relatively easy and then select a more challenging goal.   Emotional eating triggers meditation, and practice-An emotional eating meditation was introduced today to begin cultivating awareness to explore experiences and feelings related to times of overeating. Aim to cultivate mindfulness of triggers for eating, including emotional, social, environmental triggers. Continue to practice meditation 5 to 6 times a week integrating the emotional eating triggers meditation. Reflect and think about the reasons for eating triggers with common themes, including a reaction to stress, a way of having some control of our lives, and our reaction to feeling bad about self.  An eating triggers coping worksheet was given for dealing with emotional eating and other  triggers. Use the handout to record when emotional eating or other triggers occur. Strive to reflect on what else could be done besides eating and then rank the degree of ease or difficulty with changing the behavior.      Educational Handouts: Keep in Balance Assessment, Eating Triggers Meditation and Eating Triggers worksheet  Taste satisfaction exercise and meditation, CHO counting nutrition guide, Plate Method, General meditation of the breath, Reset breath,  hunger awareness meditation, Fullness exercise,    Next session:  Forgiveness Meditation/Chaining Exercise    Sandra Ortega, MS, RDN, LD, FAITH, MB-EAT-P  Advanced Practice Clinical Dietitian   Mindfulness-Based Eating Awareness Training Practitioner  [facility]   [facility] [place]   [email or web address]  Scheduling Line [phone]  Direct Line [phone]    Readiness to Change : Good  Level of Understanding : Good  Anticipated Compliant : Good

## 2025-03-06 ENCOUNTER — APPOINTMENT (OUTPATIENT)
Dept: NUTRITION | Facility: CLINIC | Age: 55
End: 2025-03-06
Payer: COMMERCIAL

## 2025-03-06 DIAGNOSIS — E11.9 TYPE 2 DIABETES MELLITUS WITHOUT COMPLICATION, UNSPECIFIED WHETHER LONG TERM INSULIN USE (MULTI): Primary | ICD-10-CM

## 2025-03-06 PROCEDURE — 97803 MED NUTRITION INDIV SUBSEQ: CPT | Performed by: DIETITIAN, REGISTERED

## 2025-03-06 NOTE — PROGRESS NOTES
Reason for Nutrition Visit:  Pt is a 55 y.o. female being seen at [facility]. Referring provider is Beba Richard DO , effective date is 6/18/24.     1. Type 2 diabetes mellitus without complication, unspecified whether long term insulin use (Multi)              Past Medical Hx:  Patient Active Problem List   Diagnosis    Essential hypertension    YAZMIN (generalized anxiety disorder)    Mixed hyperlipidemia    GERD (gastroesophageal reflux disease)    Type 2 diabetes mellitus without complication (Multi)    Dermoid cyst of conjunctiva    Fatigue    Other disorders of intestinal carbohydrate absorption    Umbilical hernia    Vitamin B12 deficiency    Vitamin D deficiency    Ventral incisional hernia    PMB (postmenopausal bleeding)    Right upper quadrant pain        Current Outpatient Medications:     ALPRAZolam (Xanax) 0.25 mg tablet, Take 1 tablet (0.25 mg) by mouth 3 times a day as needed for anxiety., Disp: 6 tablet, Rfl: 0    cholecalciferol (Vitamin D3) 25 MCG (1000 UT) capsule, Take 1 capsule (25 mcg) by mouth once daily., Disp: , Rfl:     CINNAMON BARK ORAL, Take 2 capsules by mouth once daily., Disp: , Rfl:     escitalopram (Lexapro) 20 mg tablet, TAKE 1 TABLET BY MOUTH EVERY DAY, Disp: 90 tablet, Rfl: 3    lancets (OneTouch Delica Plus Lancet) 33 gauge misc, USE AS DIRECTED three times a day, Disp: 100 each, Rfl: 3    loratadine (Claritin) 10 mg tablet, Take 1 tablet (10 mg) by mouth once daily., Disp: , Rfl:     melatonin 3 mg tablet, Take 1 tablet (3 mg) by mouth as needed at bedtime for sleep., Disp: , Rfl:     multivitamin tablet, Take 1 tablet by mouth once daily., Disp: , Rfl:     olmesartan (BENIcar) 40 mg tablet, TAKE 1 TABLET BY MOUTH EVERY DAY, Disp: 90 tablet, Rfl: 1    omega 3-dha-epa-fish oil (Fish OiL) 1,000 mg (120 mg-180 mg) capsule, Take 2 capsules (2,000 mg) by mouth 2 times a day., Disp: , Rfl:     OneTouch Ultra Test strip, USE AS DIRECTED 3 TIMES A DAY, Disp: 100 strip, Rfl: 1     pantoprazole (ProtoNix) 40 mg EC tablet, TAKE 1 TABLET (40 MG) BY MOUTH DAILY DO NOT CRUSH, CHEW, ORSPLIT, Disp: 90 tablet, Rfl: 1    rosuvastatin (Crestor) 20 mg tablet, TAKE 1 TABLET BY MOUTH EVERY DAY, Disp: 90 tablet, Rfl: 3    semaglutide 2 mg/dose (8 mg/3 mL) pen injector, Inject 2 mg under the skin 1 (one) time per week., Disp: 3 mL, Rfl: 5    Current Facility-Administered Medications:     lidocaine-epinephrine (Xylocaine W/EPI) 1 %-1:100,000 injection 10 mL, 10 mL, injection, Once, Opal Corbin MD     Anthropometrics:      Weight change:    Significant Weight Change: No  05/2024 Weight: 189 lbs  02/27/24 Weight:188 lbs 4.4 ounces.   11/2023 Weight 182 lbs 1.6 ounces  09/2023 Weight 190 lbs.   10/22/24 Weight 189 lbs   11/2024 Weight: 188 lbs   12/2024 Weight: 186 lbs   02/2025 Weight: 180 lbs  03/2025 Weight: 180 lbs    Lab Results   Component Value Date    HGBA1C 6.0 (H) 11/27/2024    CHOL 128 11/27/2024    LDLF - 09/06/2023    TRIG 402 (H) 11/27/2024    HDL 32.6 11/27/2024        Chemistry    Lab Results   Component Value Date/Time     11/27/2024 0814    K 4.4 11/27/2024 0814     11/27/2024 0814    CO2 29 11/27/2024 0814    BUN 15 11/27/2024 0814    CREATININE 0.80 11/27/2024 0814    Lab Results   Component Value Date/Time    CALCIUM 9.7 11/27/2024 0814    ALKPHOS 56 11/27/2024 0814    AST 20 11/27/2024 0814    ALT 16 11/27/2024 0814    BILITOT 0.6 11/27/2024 0814           Food and Nutrition Hx:  Pt has been using the plate method. She has not really been CHO counting.   She has been using a glucometer to test blood glucose about 130 mg/dl. She was placed on Ozempic and this has been increased. A1c was 8.9% and now it is at 5.9% and 5.8%. TG was at 416 to 208 mg/dl.   Pt feels good. Her body composition is changing and she is wearing smaller pants.    Pt had a follow-up appointment. Pt is working on eating triggers. Pt states she has been recognizing her eating triggers. When an eating  trigger occurs, she pauses and reflects. She then drinks water and then do something such as perform a chore. She then will go back to reflect if she is hunger and then will eat or if she is not hunger she will not eat. If she goes to eat, she is mindful of her food choice and amount.   She has been exercising 1 x week.    Pt is waking up until 10:00 am. She is trying to eat meals per day.     24 Diet Recall:  Meal 1: Breakfast is at 10:00 to include protein oats made with 0.5 -1 cup of cooked protein oats, 1 T of adria seeds, almond milk, and protein powder and 1 T of flaxseed and sometime 0.5 banana or prune or berries(kcal 400- 500 CHO 30- 45, protein 35)   Meal 2: Lunch is consumed at 2:00 pm to include a salad to include 3- 4 ounces of chicken, 2 cups of vegetables such as cucumber, tomatoes, 1 T of cranberries with low calorie marinade (kcal 300, CHO 20, protein 28)    Meal 3: Dinner is at 6:00 and she will consume 4 ounces of protein with breading, 2 cups of green beans, or carrots  or broccoli and 1 cup of pasta with oil or pasta sauce (kcal 300, CHO 25, protein 28)   Snacks: cottage and fruit   Beverages: 60 ounces of water per day. She occasionally drinks Crystal Light.   Total protein is 60- 90 grams.     Allergies: None  Intolerance: None  Appetite: Good  Intake: >75%  GI Symptoms : reflux Frequency: infrequent  Swallowing Difficulty: No problems with swallowing  Dentition : own    Types of Activities: Walking and Gym Membership  Duration: 90 minutes*  3 times a week at the gym at moderate intensity to include 60 minutes and 30 minutes of cardio. She also walking for 30 minutes once a week.  Total minutes per week is now 300 minutes per week  from 240 minutes per week.     Sleep duration/quality : 5-6 hours and disrupted sleep. Pt has been sleeping better.   Sleep disorders: poor sleep hygiene    Supplements: Vitamin D and Fish Oil daily. She is not taking a complete MVI.     Feelings of Hunger?: Yes and  will eat. Sore belly.       Physical Feeling: Physically full. 5-8 ad defined below     Least full-a little hunger or an absence of hunger   Moderate 5 - fullness sensation in the stomach  8-9 feel full, food is at the top of the stomach, belching, regurgitation, difficulty with movement, stomach feels too stretched     Most Full-10- feeling sick     Binging: Never  Cravings: None  Energy Levels: Stable    Food Preparation: Patient  Cooking Skills/Barriers: None reported  Grocery Shopping: Patient    Nutrition Focused Physical Exam:    Performed/Deferred: Deferred due to be being virtual visit  Malnutrition Present: No    Estimated Energy Needs:    Weight Maintanence: 1,755 kcal/day  Weight Loss Needs: 1,255 kcal/day  [place] (REE x 1.2-1.3) - 500 calories per day for wt loss.     Nutrition Diagnosis:    Nutrition Diagnosis     Patient has Nutrition Diagnosis Yes   Diagnosis Status (1) New   Nutrition Diagnosis 1 Food and nutrition related knowledge deficit   Related to (1) how to create a better relationship with food within diabetes as current A1c is at 6.0%   As Evidenced by (1) reports by pt of the need to learn.   Additional Nutrition Diagnosis Diagnosis 2   Diagnosis Status (2) New   Nutrition Diagnosis 2 Belief finding that hinders food and/or nutrition behavior change   Related to (2) using food to cope or soothe self   As Evidenced by (2) reports by pt the identification of eating triggers that lead to eating without the presence of hunger.   Diagnosis Status (3) Active   Related to (3) sedentary lifestyle   As Evidenced by (3) reported PAL level of 1.2 -1.3.     Nutrition Interventions/Recommendation:  Medical nutrition therapy was given for HLD and diabetes.   Nutrition Counseling: CBT  Coordination of Care: None  12/19/24 Discussed referral to psychology if needed to address emotions related to eating triggers.       Nutrition Prescription:  carbohydrate consistency meal plan with aim for 30- 45  grams of carbohydrates at meals and 15 grams at snack to reduce A1c. Total energy intake of 1,255 calories per day for 1 lb wt loss per week.  Heart healthy meal plan with a low saturated fat intake to <5 -6 % of energy (less than 8 grams of saturated fat per day), reduced intake of added sugars (<10% of total energy), 25 grams of fiber with intake of viscous fiber to 5 g/day to 10 g/day, plant sterols/stanols to 2 g/day, 1.1 gram of omega three fatty acids to reduce LDL and TG levels. 1,500 mg sodium restriction per day. MB-EAT Summary Goals. Increase response to hunger, taste satiety, physical fullness, and satiety.  Aim to discover food selections, amounts, and pattern to nourish the body and mind to improve energy, physical and mental stamina, contentment, and sometimes even sleep.     Nutrition Education Application Nutrition related skill education   Goals Many of us can be hard on ourselves when it comes to eating triggers. We want to shift to a nonjudgment and compassionate way of thinking partly through forgiveness. A forgiveness meditation was introduced to help. Forgiveness is related to acceptance and letting go rather than forgetting and pretending. Acceptance doesn’t mean that you figure out how to do something different than next time, but it means that you don’t respond with self-judgment that is often so automatic and can get in the way of trying different things.   Nutrition Counseling     Nutrition Counseling Strategies Nutrition counseling based on cognitive restructuring strategy   Goals Mindfulness Based Eating Awarness Training       Nutrition Monitoring and Evaluation    Knowledge Belief Attitude Determination     Monitoring and Evaluation Plan Food and nutrition knowledge   Criteria increase practice of forgiveness related to eating triggers   Anthropometric measurements     Monitoring and Evaluation Plan Weight   Biochemical Data, Medical Tests and Procedures     Monitoring and Evaluation  Plan Glucose/endocrine profile   Glucose/Endocrine Profile Hemoglobin A1c (HgbA1c)     Keep in Balance  I waited to eat until I felt physically hunger enough to-daily (5) to never (1)  I ate something tempting, rationalizing by thinking, “I deserve this (2)  I engaged in aerobic exercise for at least 20 minutes (2)     Nutrition Goals:  Via teach back method patient verbalized understanding of the following topics:    Continue to meditate 6 days a week and incorporate forgiveness meditation when needed.        Educational Handouts: Forgiveness Meditation   Educational Handouts: Keep in Balance Assessment, Eating Triggers Meditation and Eating Triggers worksheet  Taste satisfaction exercise and meditation, CHO counting nutrition guide, Plate Method, General meditation of the breath, Reset breath,  hunger awareness meditation, Fullness exercise,    Next session:  Chaining Exercise    Sandra Ortega MS, RDN, LD, FAITH, MB-EAT-P  Advanced Practice Clinical Dietitian   Mindfulness-Based Eating Awareness Training Practitioner  [facility]   [facility] [place]   [email or web address]  Scheduling Line [phone]  Direct Line [phone]    Readiness to Change : Good  Level of Understanding : Good  Anticipated Compliant : Good

## 2025-03-11 DIAGNOSIS — E78.2 MODERATE MIXED HYPERLIPIDEMIA NOT REQUIRING STATIN THERAPY: ICD-10-CM

## 2025-03-11 DIAGNOSIS — E11.9 DIABETES MELLITUS TYPE 2, NONINSULIN DEPENDENT (MULTI): ICD-10-CM

## 2025-03-11 RX ORDER — ROSUVASTATIN CALCIUM 20 MG/1
20 TABLET, COATED ORAL DAILY
Qty: 90 TABLET | Refills: 3 | Status: SHIPPED | OUTPATIENT
Start: 2025-03-11

## 2025-03-27 ENCOUNTER — APPOINTMENT (OUTPATIENT)
Dept: DERMATOLOGY | Facility: CLINIC | Age: 55
End: 2025-03-27
Payer: COMMERCIAL

## 2025-03-28 ENCOUNTER — TELEMEDICINE CLINICAL SUPPORT (OUTPATIENT)
Dept: NUTRITION | Facility: CLINIC | Age: 55
End: 2025-03-28
Payer: COMMERCIAL

## 2025-03-28 DIAGNOSIS — E11.9 TYPE 2 DIABETES MELLITUS WITHOUT COMPLICATION, UNSPECIFIED WHETHER LONG TERM INSULIN USE: Primary | ICD-10-CM

## 2025-03-28 PROCEDURE — 97803 MED NUTRITION INDIV SUBSEQ: CPT | Mod: 95 | Performed by: DIETITIAN, REGISTERED

## 2025-03-28 NOTE — PROGRESS NOTES
Reason for Nutrition Visit:  Pt is a 55 y.o. female being seen at [facility]. Referring provider is Bbea Richard DO , effective date is 6/18/24.     1. Type 2 diabetes mellitus without complication, unspecified whether long term insulin use (Multi)           Past Medical Hx:  Patient Active Problem List   Diagnosis    Essential hypertension    YAZMIN (generalized anxiety disorder)    Mixed hyperlipidemia    GERD (gastroesophageal reflux disease)    Type 2 diabetes mellitus without complication (Multi)    Dermoid cyst of conjunctiva    Fatigue    Other disorders of intestinal carbohydrate absorption    Umbilical hernia    Vitamin B12 deficiency    Vitamin D deficiency    Ventral incisional hernia    PMB (postmenopausal bleeding)    Right upper quadrant pain        Current Outpatient Medications:     ALPRAZolam (Xanax) 0.25 mg tablet, Take 1 tablet (0.25 mg) by mouth 3 times a day as needed for anxiety., Disp: 6 tablet, Rfl: 0    cholecalciferol (Vitamin D3) 25 MCG (1000 UT) capsule, Take 1 capsule (25 mcg) by mouth once daily., Disp: , Rfl:     CINNAMON BARK ORAL, Take 2 capsules by mouth once daily., Disp: , Rfl:     escitalopram (Lexapro) 20 mg tablet, TAKE 1 TABLET BY MOUTH EVERY DAY, Disp: 90 tablet, Rfl: 3    lancets (OneTouch Delica Plus Lancet) 33 gauge misc, USE AS DIRECTED three times a day, Disp: 100 each, Rfl: 3    loratadine (Claritin) 10 mg tablet, Take 1 tablet (10 mg) by mouth once daily., Disp: , Rfl:     melatonin 3 mg tablet, Take 1 tablet (3 mg) by mouth as needed at bedtime for sleep., Disp: , Rfl:     multivitamin tablet, Take 1 tablet by mouth once daily., Disp: , Rfl:     olmesartan (BENIcar) 40 mg tablet, TAKE 1 TABLET BY MOUTH EVERY DAY, Disp: 90 tablet, Rfl: 1    omega 3-dha-epa-fish oil (Fish OiL) 1,000 mg (120 mg-180 mg) capsule, Take 2 capsules (2,000 mg) by mouth 2 times a day., Disp: , Rfl:     OneTouch Ultra Test strip, USE AS DIRECTED 3 TIMES A DAY, Disp: 100 strip, Rfl: 1    pantoprazole  (ProtoNix) 40 mg EC tablet, TAKE 1 TABLET (40 MG) BY MOUTH DAILY DO NOT CRUSH, CHEW, ORSPLIT, Disp: 90 tablet, Rfl: 1    rosuvastatin (Crestor) 20 mg tablet, TAKE 1 TABLET BY MOUTH EVERY DAY, Disp: 90 tablet, Rfl: 3    semaglutide 2 mg/dose (8 mg/3 mL) pen injector, Inject 2 mg under the skin 1 (one) time per week., Disp: 3 mL, Rfl: 5    Current Facility-Administered Medications:     lidocaine-epinephrine (Xylocaine W/EPI) 1 %-1:100,000 injection 10 mL, 10 mL, injection, Once, Opal Corbin MD     Anthropometrics:      Weight change:    Significant Weight Change: No  05/2024 Weight: 189 lbs  02/27/24 Weight:188 lbs 4.4 ounces.   11/2023 Weight 182 lbs 1.6 ounces  09/2023 Weight 190 lbs.   10/22/24 Weight 189 lbs   11/2024 Weight: 188 lbs   12/2024 Weight: 186 lbs   02/2025 Weight: 180 lbs  03/2025 Weight: 180 lbs    Lab Results   Component Value Date    HGBA1C 6.0 (H) 11/27/2024    CHOL 128 11/27/2024    LDLF - 09/06/2023    TRIG 402 (H) 11/27/2024    HDL 32.6 11/27/2024        Chemistry    Lab Results   Component Value Date/Time     11/27/2024 0814    K 4.4 11/27/2024 0814     11/27/2024 0814    CO2 29 11/27/2024 0814    BUN 15 11/27/2024 0814    CREATININE 0.80 11/27/2024 0814    Lab Results   Component Value Date/Time    CALCIUM 9.7 11/27/2024 0814    ALKPHOS 56 11/27/2024 0814    AST 20 11/27/2024 0814    ALT 16 11/27/2024 0814    BILITOT 0.6 11/27/2024 0814           Food and Nutrition Hx:  Pt has been using the plate method. She has not really been CHO counting.   She has been using a glucometer to test blood glucose about 130 mg/dl. She was placed on Ozempic and this has been increased. A1c was 8.9% and now it is at 5.9%, 5.8%, and now 6.0%. TG was at 416 to 208 mg/dl.   Pt feels good. Her body composition is changing and she is wearing smaller pants.    Pt had follow-up appointment. She reports she would like to change exercise. She goes to the gym 1 -2 times a week for 60 minutes. Total  minutes per week ~120 minutes She does resistance exercise and cardio.   Pt continues to work on her mindful of her eating. Pt states she has not been triggered to eat in the past few weeks. She has been working on feeling when her body is hungry, she pauses, and then strive to nourish the body. She reports she doing really well with the mindfulness and eating. She reports now observes she picks at food when she is not hungry and this was described during the eating triggers exercise....could exercise help this.     Pt is waking up until 10:00 am. She is trying to eat meals per day.     24 Diet Recall:  Meal 1: Breakfast is at 10:00 to include protein oats made with 0.5 -1 cup of cooked protein oats, 1 T of adria seeds, almond milk, and protein powder and 1 T of flaxseed and sometime 0.5 banana or prune or berries(kcal 400- 500 CHO 30- 45, protein 35)   Meal 2: Lunch is consumed at 2:00 pm to include a salad to include 3- 4 ounces of chicken, 2 cups of vegetables such as cucumber, tomatoes, 1 T of cranberries with low calorie marinade (kcal 300, CHO 20, protein 28)    Meal 3: Dinner is at 6:00 and she will consume 4 ounces of protein with breading, 2 cups of green beans, or carrots  or broccoli and 1 cup of pasta with oil or pasta sauce (kcal 300, CHO 25, protein 28)   Snacks: cottage and fruit   Beverages: 60 ounces of water per day. She occasionally drinks Crystal Light.   Total protein is 60- 90 grams.     Allergies: None  Intolerance: None  Appetite: Good  Intake: >75%  GI Symptoms : reflux Frequency: infrequent  Swallowing Difficulty: No problems with swallowing  Dentition : own    Types of Activities: Walking and Gym Membership  Duration: 90 minutes*  3 times a week at the gym at moderate intensity to include 60 minutes and 30 minutes of cardio. She also walking for 30 minutes once a week.  Total minutes per week is now 300 minutes per week  from 240 minutes per week.     Sleep duration/quality : 5-6 hours and  disrupted sleep. Pt has been sleeping better.   Sleep disorders: poor sleep hygiene    Supplements: Vitamin D and Fish Oil daily. She is not taking a complete MVI.     Feelings of Hunger?: Yes and will eat. Sore belly.       Physical Feeling: Physically full. 5-8 ad defined below     Least full-a little hunger or an absence of hunger   Moderate 5 - fullness sensation in the stomach  8-9 feel full, food is at the top of the stomach, belching, regurgitation, difficulty with movement, stomach feels too stretched     Most Full-10- feeling sick     Binging: Never  Cravings: None  Energy Levels: Stable    Food Preparation: Patient  Cooking Skills/Barriers: None reported  Grocery Shopping: Patient    Nutrition Focused Physical Exam:    Performed/Deferred: Deferred due to be being virtual visit  Malnutrition Present: No    Estimated Energy Needs:    Weight Maintanence: 1,755 kcal/day  Weight Loss Needs: 1,255 kcal/day  [place] (REE x 1.2-1.3) - 500 calories per day for wt loss.     Nutrition Diagnosis:  Nutrition Diagnosis     Patient has Nutrition Diagnosis Yes   Diagnosis Status (1) Active   Nutrition Diagnosis 1 Food and nutrition related knowledge deficit   Related to (1) how to create a better relationship with food within diabetes as current A1c is at 6.0%   As Evidenced by (1) reports by pt of the need to learn.   Additional Nutrition Diagnosis Diagnosis 3   Diagnosis Status (2) Active   Nutrition Diagnosis 2 Belief finding that hinders food and/or nutrition behavior change   Related to (2) using food to cope or soothe self   As Evidenced by (2) reports by pt the identification of eating triggers that lead to eating without the presence of hunger.   Diagnosis Status (3) Active   Related to (3) sedentary lifestyle   As Evidenced by (3) reported PAL level of 1.2 -1.3.         Nutrition Interventions/Recommendation:  Medical nutrition therapy was given for HLD and diabetes.     Nutrition  Prescription:  carbohydrate consistency meal plan with aim for 30- 45 grams of carbohydrates at meals and 15 grams at snack to reduce A1c. Total energy intake of 1,255 calories per day for 1 lb wt loss per week.  Heart healthy meal plan with a low saturated fat intake to <5 -6 % of energy (less than 8 grams of saturated fat per day), reduced intake of added sugars (<10% of total energy), 25 grams of fiber with intake of viscous fiber to 5 g/day to 10 g/day, plant sterols/stanols to 2 g/day, 1.1 gram of omega three fatty acids to reduce LDL and TG levels. 1,500 mg sodium restriction per day. MB-EAT Summary Goals. Increase response to hunger, taste satiety, physical fullness, and satiety.  Aim to discover food selections, amounts, and pattern to nourish the body and mind to improve energy, physical and mental stamina, contentment, and sometimes even sleep.     Nutrition Education     Nutrition Education Content Physical activity guidance   Goals 300 minutes of exercise per week for weight loss   Nutrition Counseling     Nutrition Counseling Strategies Nutrition counseling based on cognitive restructuring strategy   Goals Mindfulness Based Eating Awarness Training     Nutrition Monitoring and Evaluation    Knowledge Belief Attitude Determination     Monitoring and Evaluation Plan Food and nutrition knowledge; Physical activity   Food and nutrition knowledge Nutrition knowledge of individual client   Criteria reduced occurrence of eating without hunger present   Physical activity Consistency; Type of physical activity; Frequency   Anthropometric measurements     Monitoring and Evaluation Plan Weight   Body Weight Body weight   Biochemical Data, Medical Tests and Procedures     Monitoring and Evaluation Plan Glucose/endocrine profile   Glucose/Endocrine Profile Hemoglobin A1c (HgbA1c)     Keep in Balance  I waited to eat until I felt physically hunger enough to-daily (5) to never (1)  I ate something tempting,  rationalizing by thinking, “I deserve this (2)  I engaged in aerobic exercise for at least 20 minutes (2)     Nutrition Goals:  Via teach back method patient verbalized understanding of the following topics:    The recommendation for exercise and wt loss is 300- 400 minutes per week. Aim for 60 exercise 5 days a week even if it is broken up into intervals. Incorporate both cardiovascular activity with resistance training. Incorporate yoga one a day a week. An exercise routine incorporates a 5 minutes of warm up and then stretching, cardiovascular exercise/resistance, 5 -10- minutes of cool down and then stretching.       Educational Handouts:   Forgiveness Meditation   Keep in Balance Assessment, Eating Triggers Meditation and Eating Triggers worksheet  Fullness exercise,    Taste satisfaction exercise and meditation,   hunger awareness meditation,   General meditation of the breath, Reset breath,    CHO counting nutrition guide,   Plate Method,   Next session:  Chaining Exercise    Sandra Ortega, MS, RDN, LD, FAITH, MB-EAT-P  Advanced Practice Clinical Dietitian   Mindfulness-Based Eating Awareness Training Practitioner  [facility]   [facility] [place]   [email or web address]  Scheduling Line [phone]  Direct Line [phone]    Readiness to Change : Good  Level of Understanding : Good  Anticipated Compliant : Good

## 2025-04-08 ENCOUNTER — APPOINTMENT (OUTPATIENT)
Dept: OBSTETRICS AND GYNECOLOGY | Facility: CLINIC | Age: 55
End: 2025-04-08
Payer: COMMERCIAL

## 2025-04-08 VITALS
DIASTOLIC BLOOD PRESSURE: 75 MMHG | WEIGHT: 181 LBS | HEART RATE: 101 BPM | SYSTOLIC BLOOD PRESSURE: 109 MMHG | BODY MASS INDEX: 30.16 KG/M2 | HEIGHT: 65 IN

## 2025-04-08 DIAGNOSIS — Z01.419 ENCOUNTER FOR GYNECOLOGICAL EXAMINATION WITHOUT ABNORMAL FINDING: Primary | ICD-10-CM

## 2025-04-08 DIAGNOSIS — N95.8 GENITOURINARY SYNDROME OF MENOPAUSE: ICD-10-CM

## 2025-04-08 PROCEDURE — 3074F SYST BP LT 130 MM HG: CPT | Performed by: OBSTETRICS & GYNECOLOGY

## 2025-04-08 PROCEDURE — 3078F DIAST BP <80 MM HG: CPT | Performed by: OBSTETRICS & GYNECOLOGY

## 2025-04-08 PROCEDURE — 4010F ACE/ARB THERAPY RXD/TAKEN: CPT | Performed by: OBSTETRICS & GYNECOLOGY

## 2025-04-08 PROCEDURE — 3008F BODY MASS INDEX DOCD: CPT | Performed by: OBSTETRICS & GYNECOLOGY

## 2025-04-08 PROCEDURE — 99396 PREV VISIT EST AGE 40-64: CPT | Performed by: OBSTETRICS & GYNECOLOGY

## 2025-04-08 RX ORDER — ESTRADIOL 0.1 MG/G
0.5 CREAM VAGINAL 3 TIMES WEEKLY
Qty: 42.5 G | Refills: 3 | Status: SHIPPED | OUTPATIENT
Start: 2025-04-09 | End: 2026-04-09
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
## 2025-05-01 ENCOUNTER — APPOINTMENT (OUTPATIENT)
Dept: NUTRITION | Facility: CLINIC | Age: 55
End: 2025-05-01
Payer: COMMERCIAL

## 2025-05-01 DIAGNOSIS — E11.9 TYPE 2 DIABETES MELLITUS WITHOUT COMPLICATION, UNSPECIFIED WHETHER LONG TERM INSULIN USE: Primary | ICD-10-CM

## 2025-05-01 PROCEDURE — 97803 MED NUTRITION INDIV SUBSEQ: CPT | Performed by: DIETITIAN, REGISTERED

## 2025-05-01 NOTE — PROGRESS NOTES
Reason for Nutrition Visit:  Pt is a 55 y.o. female being seen at [facility]. Referring provider is Beba Richard DO , effective date is 6/18/24.     1. Type 2 diabetes mellitus without complication, unspecified whether long term insulin use [E11.9]           Past Medical Hx:  Patient Active Problem List   Diagnosis    Essential hypertension    YAZMIN (generalized anxiety disorder)    Mixed hyperlipidemia    GERD (gastroesophageal reflux disease)    Type 2 diabetes mellitus without complication    Dermoid cyst of conjunctiva    Fatigue    Other disorders of intestinal carbohydrate absorption    Umbilical hernia    Vitamin B12 deficiency    Vitamin D deficiency    Ventral incisional hernia    PMB (postmenopausal bleeding)    Right upper quadrant pain        Current Outpatient Medications:     ALPRAZolam (Xanax) 0.25 mg tablet, Take 1 tablet (0.25 mg) by mouth 3 times a day as needed for anxiety., Disp: 6 tablet, Rfl: 0    cholecalciferol (Vitamin D3) 25 MCG (1000 UT) capsule, Take 1 capsule (25 mcg) by mouth once daily., Disp: , Rfl:     CINNAMON BARK ORAL, Take 2 capsules by mouth once daily., Disp: , Rfl:     escitalopram (Lexapro) 20 mg tablet, TAKE 1 TABLET BY MOUTH EVERY DAY, Disp: 90 tablet, Rfl: 3    estradiol (Estrace) 0.01 % (0.1 mg/gram) vaginal cream, Insert 0.125 Applicatorfuls (0.5 g) into the vagina 3 times a week. Begin with 0.5 g in the vagina every night for 2 weeks, and then 3 times weekly thereafter., Disp: 42.5 g, Rfl: 3    lancets (OneTouch Delica Plus Lancet) 33 gauge misc, USE AS DIRECTED three times a day, Disp: 100 each, Rfl: 3    loratadine (Claritin) 10 mg tablet, Take 1 tablet (10 mg) by mouth once daily., Disp: , Rfl:     multivitamin tablet, Take 1 tablet by mouth once daily., Disp: , Rfl:     olmesartan (BENIcar) 40 mg tablet, TAKE 1 TABLET BY MOUTH EVERY DAY, Disp: 90 tablet, Rfl: 1    omega 3-dha-epa-fish oil (Fish OiL) 1,000 mg (120 mg-180 mg) capsule, Take 2 capsules (2,000 mg) by mouth  2 times a day., Disp: , Rfl:     OneTouch Ultra Test strip, USE AS DIRECTED 3 TIMES A DAY, Disp: 100 strip, Rfl: 1    pantoprazole (ProtoNix) 40 mg EC tablet, TAKE 1 TABLET (40 MG) BY MOUTH DAILY DO NOT CRUSH, CHEW, ORSPLIT, Disp: 90 tablet, Rfl: 1    rosuvastatin (Crestor) 20 mg tablet, TAKE 1 TABLET BY MOUTH EVERY DAY, Disp: 90 tablet, Rfl: 3    semaglutide 2 mg/dose (8 mg/3 mL) pen injector, Inject 2 mg under the skin 1 (one) time per week., Disp: 3 mL, Rfl: 5    Current Facility-Administered Medications:     lidocaine-epinephrine (Xylocaine W/EPI) 1 %-1:100,000 injection 10 mL, 10 mL, injection, Once, Opal Corbin MD     Anthropometrics:      Weight change:    Significant Weight Change: No  05/2024 Weight: 189 lbs  02/27/24 Weight:188 lbs 4.4 ounces.   11/2023 Weight 182 lbs 1.6 ounces  09/2023 Weight 190 lbs.   10/22/24 Weight 189 lbs   11/2024 Weight: 188 lbs   12/2024 Weight: 186 lbs   02/2025 Weight: 180 lbs  03/2025 Weight: 180 lbs    Lab Results   Component Value Date    HGBA1C 6.0 (H) 11/27/2024    CHOL 128 11/27/2024    LDLF - 09/06/2023    TRIG 402 (H) 11/27/2024    HDL 32.6 11/27/2024        Chemistry    Lab Results   Component Value Date/Time     11/27/2024 0814    K 4.4 11/27/2024 0814     11/27/2024 0814    CO2 29 11/27/2024 0814    BUN 15 11/27/2024 0814    CREATININE 0.80 11/27/2024 0814    Lab Results   Component Value Date/Time    CALCIUM 9.7 11/27/2024 0814    ALKPHOS 56 11/27/2024 0814    AST 20 11/27/2024 0814    ALT 16 11/27/2024 0814    BILITOT 0.6 11/27/2024 0814           Food and Nutrition Hx:  Pt has been using the plate method. She has not really been CHO counting.   She has been using a glucometer to test blood glucose about 130 mg/dl. She was placed on Ozempic and this has been increased. A1c was 8.9% and now it is at 5.9%, 5.8%, and now 6.0%. TG was at 416 to 208 mg/dl.   Pt feels good. Her body composition is changing and she is wearing smaller pants.    Pt had  follow-up appointment.   She goes to the gym 2 times a week for 60 minutes or more. Total minutes per week ~120 minutes She does resistance exercise and cardio.     Pt continues to work on her mindful of her eating.She has been working on feeling when her body is hungry, she pauses, and then strive to nourish the body. She reports she doing really well with the mindfulness and eating.     Pt is waking up until 10:00 am. She is trying to eat meals per day.     24 Diet Recall:  Meal 1: Breakfast is at 10:00 to include protein oats made with 0.5 -1 cup of cooked protein oats, 1 T of adria seeds, almond milk, and protein powder and 1 T of flaxseed and sometime 0.5 banana or prune or berries(kcal 400- 500 CHO 30- 45, protein 35)   Meal 2: Lunch is consumed at 2:00 pm to include a salad to include 3- 4 ounces of chicken, 2 cups of vegetables such as cucumber, tomatoes, 1 T of cranberries with low calorie marinade (kcal 300, CHO 20, protein 28)    Snack can be a rice cake and peanut butter.   Meal 3: Dinner is at 6:00 and she will consume 4 ounces of protein with breading, 2 cups of green beans, or carrots  or broccoli and 1 cup of pasta with oil or pasta sauce (kcal 300, CHO 25, protein 28)   Snacks: cottage and fruit   Beverages: 60 ounces of water per day. She occasionally drinks Crystal Light.   Total protein is 60- 90 grams.     Allergies: None  Intolerance: None  Appetite: Good  Intake: >75%  GI Symptoms : reflux Frequency: infrequent  Swallowing Difficulty: No problems with swallowing  Dentition : own    Types of Activities: Walking and Gym Membership  Duration: 90 minutes* 3 times a week at the gym at moderate intensity to include 60 minutes and 30 minutes of cardio. She also walking for 30 minutes once a week.  Total minutes per week is now 300 minutes per week  from 240 minutes per week.     Sleep duration/quality : 5-6 hours and disrupted sleep. Pt has been sleeping better.   Sleep disorders: poor sleep  hygiene    Supplements: Vitamin D and Fish Oil daily. She is not taking a complete MVI.     Feelings of Hunger?: Yes and will eat. Sore belly.       Physical Feeling: Physically full. 5-8 ad defined below     Least full-a little hunger or an absence of hunger   Moderate 5 - fullness sensation in the stomach  8-9 feel full, food is at the top of the stomach, belching, regurgitation, difficulty with movement, stomach feels too stretched     Most Full-10- feeling sick     Binging: Never  Cravings: None  Energy Levels: Stable    Food Preparation: Patient  Cooking Skills/Barriers: None reported  Grocery Shopping: Patient    Nutrition Focused Physical Exam:    Performed/Deferred: Deferred due to be being virtual visit  Malnutrition Present: No    Estimated Energy Needs:    Weight Maintanence: 1,755 kcal/day  Weight Loss Needs: 1,255 kcal/day  [place] St[place]ja Elena (REE x 1.2-1.3) - 500 calories per day for wt loss.     Nutrition Diagnosis:  Nutrition Diagnosis     Patient has Nutrition Diagnosis Yes   Diagnosis Status (1) Active   Nutrition Diagnosis 1 Food and nutrition related knowledge deficit   Related to (1) how to create a better relationship with food within diabetes as current A1c is at 6.0%   As Evidenced by (1) reports by pt of the need to learn.   Additional Nutrition Diagnosis Diagnosis 3   Diagnosis Status (2) Active   Nutrition Diagnosis 2 Belief finding that hinders food and/or nutrition behavior change   Related to (2) using food to cope or soothe self   As Evidenced by (2) reports by pt the identification of eating triggers that lead to eating without the presence of hunger.   Diagnosis Status (3) Active   Related to (3) sedentary lifestyle   As Evidenced by (3) reported PAL level of 1.2 -1.3.       Nutrition Interventions/Recommendation:  Medical nutrition therapy was given for HLD and diabetes.     Nutrition Prescription:  carbohydrate consistency meal plan with aim for 30- 45 grams of carbohydrates at meals  and 15 grams at snack to reduce A1c. Total energy intake of 1,255 calories per day for 1 lb wt loss per week.  Heart healthy meal plan with a low saturated fat intake to <5 -6 % of energy (less than 8 grams of saturated fat per day), reduced intake of added sugars (<10% of total energy), 25 grams of fiber with intake of viscous fiber to 5 g/day to 10 g/day, plant sterols/stanols to 2 g/day, 1.1 gram of omega three fatty acids to reduce LDL and TG levels. 1,500 mg sodium restriction per day. MB-EAT Summary Goals. Increase response to hunger, taste satiety, physical fullness, and satiety.  Aim to discover food selections, amounts, and pattern to nourish the body and mind to improve energy, physical and mental stamina, contentment, and sometimes even sleep.     Nutrition Education     Nutrition Education Content Physical activity guidance   Goals 300 minutes of exercise per week for weight loss   Nutrition Counseling     Nutrition Counseling Strategies Nutrition counseling based on cognitive restructuring strategy   Goals Mindfulness Based Eating Awarness Training-Chaining Exercise-The chaining exercise within mindfulness links eating with emotions, thoughts and behaviors. Eating in response to emotions can involve chain reactions that we may not even realize are happening. We are going to develop tools of mindfulness by exploring the cycle of reactivity that causes stress to spiral out of control. This is the cycle that may lead us to eat when we are not hungry or may lead us to overeat. Exploring this can be difficult as we would rather not behave/deal with eating behaviors or even stress. We are going to explore more healthy ways to cope; however, it must be mentioned we often get stuck in old patterns. We address this by placing mindfulness between the event or the trigger and the reaction to give us a moment of “breathing space” and to allow us to notice what is happening so that we have a choice instead of an  “automatic ” reaction.     Nutrition Monitoring and Evaluation    Knowledge Belief Attitude Determination     Monitoring and Evaluation Plan Food and nutrition knowledge; Physical activity   Criteria reduced occurrence of eating without hunger present and complete the chainging exercise   Physical activity Consistency; Type of physical activity; Frequency   Anthropometric measurements     Monitoring and Evaluation Plan Weight   Body Weight Body weight   Biochemical Data, Medical Tests and Procedures     Monitoring and Evaluation Plan Glucose/endocrine profile   Glucose/Endocrine Profile Hemoglobin A1c (HgbA1c)     Keep in Balance  I waited to eat until I felt physically hunger enough to-daily (5) to never (1)  I ate something tempting, rationalizing by thinking, “I deserve this (2)  I engaged in aerobic exercise for at least 20 minutes (2)     Nutrition Goals:  Via teach back method patient verbalized understanding of the following topics:    Complete the chaining exercise by reflecting on a time when eating was not in balance. Separate thoughts from the emotional response that follows, from food seeking or eating behavior that occurs next. The intention is to strike a balance between enough detail so that you can illustrate the cascade of thoughts, emotions and behaviors and to move along, so the bigger picture stays clear, especially if the chain reaction occurs over many hours.   The recommendation for exercise and wt loss is 300- 400 minutes per week. Aim for 60 minutes of exercise 5 days a week even if it is broken up into intervals. Incorporate both cardiovascular activity with resistance training. Incorporate yoga one a day a week. An exercise routine incorporates a 5 minutes of warm up and then stretching, cardiovascular exercise/resistance, 5 -10- minutes of cool down and then stretching.       Educational Handouts: Chaining Exercise   Forgiveness Meditation   Keep in Balance Assessment, Eating  Triggers Meditation and Eating Triggers worksheet  Fullness exercise,    Taste satisfaction exercise and meditation,   hunger awareness meditation,   General meditation of the breath, Reset breath,    CHO counting nutrition guide,   Plate Method,   Next session:  Chaining Exercise+ Keep in Balance again     Sandra Ortega, MS, RDN, LD, FAITH, MB-EAT-P  Advanced Practice Clinical Dietitian   Mindfulness-Based Eating Awareness Training Practitioner  [facility]   [facility]   [email or web address]  Scheduling Line [phone]  Direct Line [phone]    Readiness to Change : Good  Level of Understanding : Good  Anticipated Compliant : Good

## 2025-05-07 DIAGNOSIS — Z00.00 ENCOUNTER FOR PREVENTATIVE ADULT HEALTH CARE EXAMINATION: Primary | ICD-10-CM

## 2025-05-07 DIAGNOSIS — E11.9 TYPE 2 DIABETES MELLITUS WITHOUT COMPLICATION, UNSPECIFIED WHETHER LONG TERM INSULIN USE: ICD-10-CM

## 2025-05-15 DIAGNOSIS — E11.9 TYPE 2 DIABETES MELLITUS WITHOUT COMPLICATION, UNSPECIFIED WHETHER LONG TERM INSULIN USE: ICD-10-CM

## 2025-05-15 RX ORDER — SEMAGLUTIDE 2.68 MG/ML
2 INJECTION, SOLUTION SUBCUTANEOUS
Qty: 3 ML | Refills: 1 | Status: SHIPPED | OUTPATIENT
Start: 2025-05-18

## 2025-05-16 ENCOUNTER — APPOINTMENT (OUTPATIENT)
Dept: NUTRITION | Facility: CLINIC | Age: 55
End: 2025-05-16
Payer: COMMERCIAL

## 2025-05-17 LAB
ALBUMIN/CREAT UR: NORMAL
CHOLEST SERPL-MCNC: 103 MG/DL
CHOLEST/HDLC SERPL: 3.1 (CALC)
CREAT UR-MCNC: NORMAL MG/DL
EST. AVERAGE GLUCOSE BLD GHB EST-MCNC: 120 MG/DL
EST. AVERAGE GLUCOSE BLD GHB EST-SCNC: 6.6 MMOL/L
HBA1C MFR BLD: 5.8 %
HDLC SERPL-MCNC: 33 MG/DL
LDLC SERPL CALC-MCNC: 44 MG/DL (CALC)
MICROALBUMIN UR-MCNC: NORMAL
NONHDLC SERPL-MCNC: 70 MG/DL (CALC)
TRIGL SERPL-MCNC: 184 MG/DL

## 2025-05-19 LAB
ALBUMIN/CREAT UR: 3 MG/G CREAT
CHOLEST SERPL-MCNC: 103 MG/DL
CHOLEST/HDLC SERPL: 3.1 (CALC)
CREAT UR-MCNC: 127 MG/DL (ref 20–275)
EST. AVERAGE GLUCOSE BLD GHB EST-MCNC: 120 MG/DL
EST. AVERAGE GLUCOSE BLD GHB EST-SCNC: 6.6 MMOL/L
HBA1C MFR BLD: 5.8 %
HDLC SERPL-MCNC: 33 MG/DL
LDLC SERPL CALC-MCNC: 44 MG/DL (CALC)
MICROALBUMIN UR-MCNC: 0.4 MG/DL
NONHDLC SERPL-MCNC: 70 MG/DL (CALC)
TRIGL SERPL-MCNC: 184 MG/DL

## 2025-05-28 ENCOUNTER — APPOINTMENT (OUTPATIENT)
Dept: PRIMARY CARE | Facility: CLINIC | Age: 55
End: 2025-05-28
Payer: COMMERCIAL

## 2025-05-29 ENCOUNTER — APPOINTMENT (OUTPATIENT)
Dept: PRIMARY CARE | Facility: CLINIC | Age: 55
End: 2025-05-29
Payer: COMMERCIAL

## 2025-05-29 VITALS
DIASTOLIC BLOOD PRESSURE: 78 MMHG | HEART RATE: 83 BPM | SYSTOLIC BLOOD PRESSURE: 125 MMHG | BODY MASS INDEX: 31.45 KG/M2 | OXYGEN SATURATION: 97 % | WEIGHT: 189 LBS

## 2025-05-29 DIAGNOSIS — Z00.00 HEALTHCARE MAINTENANCE: ICD-10-CM

## 2025-05-29 DIAGNOSIS — E11.9 TYPE 2 DIABETES MELLITUS WITHOUT COMPLICATION, UNSPECIFIED WHETHER LONG TERM INSULIN USE: Primary | ICD-10-CM

## 2025-05-29 DIAGNOSIS — I10 ESSENTIAL HYPERTENSION: ICD-10-CM

## 2025-05-29 DIAGNOSIS — L72.9 CYST OF SKIN: ICD-10-CM

## 2025-05-29 DIAGNOSIS — E78.2 MIXED HYPERLIPIDEMIA: ICD-10-CM

## 2025-05-29 PROCEDURE — 99214 OFFICE O/P EST MOD 30 MIN: CPT | Performed by: INTERNAL MEDICINE

## 2025-05-29 PROCEDURE — 1036F TOBACCO NON-USER: CPT | Performed by: INTERNAL MEDICINE

## 2025-05-29 PROCEDURE — 3078F DIAST BP <80 MM HG: CPT | Performed by: INTERNAL MEDICINE

## 2025-05-29 PROCEDURE — 3074F SYST BP LT 130 MM HG: CPT | Performed by: INTERNAL MEDICINE

## 2025-05-29 ASSESSMENT — PATIENT HEALTH QUESTIONNAIRE - PHQ9
2. FEELING DOWN, DEPRESSED OR HOPELESS: NOT AT ALL
1. LITTLE INTEREST OR PLEASURE IN DOING THINGS: NOT AT ALL
SUM OF ALL RESPONSES TO PHQ9 QUESTIONS 1 AND 2: 0

## 2025-05-29 ASSESSMENT — PAIN SCALES - GENERAL: PAINLEVEL_OUTOF10: 0-NO PAIN
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
## 2025-05-29 NOTE — PATIENT INSTRUCTIONS
[place],   Blood pressure   Go to www.validatebp.org for list of validated blood pressure cuffs.   I recommend you monitor your home blood pressure readings. To do this, be sure that the blood pressure cuff is on bare skin. Feet should be planted on the floor and back should be supported. Arm should be resting at the level of the heart. No talking to anyone during measurement and no caffeine within 30 minutes of checking blood pressure.   Goal should be in the 120s/70s on AVERAGE (outliers do not count).   Ideally, you should measure 2 readings in the morning and 2 in the evening for 2 weeks and write these down.    Let me know if elevated and we will introduce olmesartan at a lower dose     Followup with Dr. Starkey       Followup 6 months for physical

## 2025-07-01 ENCOUNTER — HOSPITAL ENCOUNTER (OUTPATIENT)
Dept: RADIOLOGY | Facility: HOSPITAL | Age: 55
Discharge: HOME | End: 2025-07-01
Payer: COMMERCIAL

## 2025-07-01 DIAGNOSIS — N94.9 ADNEXAL CYST: ICD-10-CM

## 2025-07-01 PROCEDURE — 76856 US EXAM PELVIC COMPLETE: CPT

## 2025-07-01 PROCEDURE — 76856 US EXAM PELVIC COMPLETE: CPT | Performed by: RADIOLOGY

## 2025-07-03 NOTE — RESULT ENCOUNTER NOTE
The ultrasound was done for 1 year follow-up after the MRI in April 2024 noted a 2.6 cm left ovarian cyst.  The left cyst is now smaller at 2.2 cm.  There is no collection of fluid in the pelvis or concerns regarding either ovary.    Since there is no change, this is consistent with a benign etiology.  Follow-up as needed.

## 2025-07-06 DIAGNOSIS — E11.9 TYPE 2 DIABETES MELLITUS WITHOUT COMPLICATION, UNSPECIFIED WHETHER LONG TERM INSULIN USE: ICD-10-CM

## 2025-07-07 RX ORDER — SEMAGLUTIDE 2.68 MG/ML
2 INJECTION, SOLUTION SUBCUTANEOUS
Qty: 3 ML | Refills: 3 | Status: SHIPPED | OUTPATIENT
Start: 2025-07-13

## 2025-07-16 ENCOUNTER — TELEPHONE (OUTPATIENT)
Dept: PRIMARY CARE | Facility: CLINIC | Age: 55
End: 2025-07-16

## 2025-07-16 ENCOUNTER — OFFICE VISIT (OUTPATIENT)
Dept: PRIMARY CARE | Facility: CLINIC | Age: 55
End: 2025-07-16
Payer: COMMERCIAL

## 2025-07-16 VITALS — BODY MASS INDEX: 30.12 KG/M2 | SYSTOLIC BLOOD PRESSURE: 138 MMHG | WEIGHT: 181 LBS | DIASTOLIC BLOOD PRESSURE: 86 MMHG

## 2025-07-16 DIAGNOSIS — E11.9 TYPE 2 DIABETES MELLITUS WITHOUT COMPLICATION, UNSPECIFIED WHETHER LONG TERM INSULIN USE: Primary | ICD-10-CM

## 2025-07-16 DIAGNOSIS — E11.9 TYPE 2 DIABETES MELLITUS WITHOUT COMPLICATION, UNSPECIFIED WHETHER LONG TERM INSULIN USE: ICD-10-CM

## 2025-07-16 DIAGNOSIS — F41.0 PANIC: ICD-10-CM

## 2025-07-16 DIAGNOSIS — I10 ESSENTIAL HYPERTENSION: ICD-10-CM

## 2025-07-16 PROCEDURE — 99214 OFFICE O/P EST MOD 30 MIN: CPT | Performed by: INTERNAL MEDICINE

## 2025-07-16 PROCEDURE — 3075F SYST BP GE 130 - 139MM HG: CPT | Performed by: INTERNAL MEDICINE

## 2025-07-16 PROCEDURE — 4010F ACE/ARB THERAPY RXD/TAKEN: CPT | Performed by: INTERNAL MEDICINE

## 2025-07-16 PROCEDURE — 1036F TOBACCO NON-USER: CPT | Performed by: INTERNAL MEDICINE

## 2025-07-16 PROCEDURE — 3079F DIAST BP 80-89 MM HG: CPT | Performed by: INTERNAL MEDICINE

## 2025-07-16 RX ORDER — TIRZEPATIDE 5 MG/.5ML
5 INJECTION, SOLUTION SUBCUTANEOUS WEEKLY
Qty: 2 ML | Refills: 0 | Status: SHIPPED | OUTPATIENT
Start: 2025-07-16

## 2025-07-16 RX ORDER — ALPRAZOLAM 0.25 MG/1
0.25 TABLET ORAL 3 TIMES DAILY PRN
Qty: 6 TABLET | Refills: 0 | Status: SHIPPED | OUTPATIENT
Start: 2025-07-16 | End: 2026-03-13

## 2025-07-16 RX ORDER — OLMESARTAN MEDOXOMIL 20 MG/1
20 TABLET ORAL DAILY
Qty: 30 TABLET | Refills: 1 | Status: SHIPPED | OUTPATIENT
Start: 2025-07-16 | End: 2025-09-14

## 2025-07-16 ASSESSMENT — ENCOUNTER SYMPTOMS: HYPERTENSION: 1

## 2025-07-16 ASSESSMENT — PAIN SCALES - GENERAL: PAINLEVEL_OUTOF10: 0-NO PAIN
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
## 2025-07-16 NOTE — PATIENT INSTRUCTIONS
[place],   Blood pressure - start OLMESARTAN 20mg   Blood test in 2 weeks   We will try and switch to Mounjaro 5mg  Continue healthy lifestyle     Followup 6 weeks

## 2025-07-23 ENCOUNTER — APPOINTMENT (OUTPATIENT)
Dept: PRIMARY CARE | Facility: CLINIC | Age: 55
End: 2025-07-23
Payer: COMMERCIAL

## 2025-07-28 NOTE — TELEPHONE ENCOUNTER
Spoke to the pt, I let her know I spoke to the pharmacy and they just don't have the med in stock. They have ordered the med for her but she is welcome to check other [facility] in the meantime. Pt opted to wait for [facility] to [facility] which should [facility].

## 2025-08-04 RX ORDER — TIRZEPATIDE 5 MG/.5ML
5 INJECTION, SOLUTION SUBCUTANEOUS
Refills: 0 | OUTPATIENT
Start: 2025-08-10

## 2025-08-10 DIAGNOSIS — K21.9 GASTROESOPHAGEAL REFLUX DISEASE, UNSPECIFIED WHETHER ESOPHAGITIS PRESENT: ICD-10-CM

## 2025-08-11 RX ORDER — PANTOPRAZOLE SODIUM 40 MG/1
40 TABLET, DELAYED RELEASE ORAL DAILY
Qty: 90 TABLET | Refills: 1 | Status: SHIPPED | OUTPATIENT
Start: 2025-08-11

## 2025-08-21 ASSESSMENT — ENCOUNTER SYMPTOMS
HUNGER: 0
TREMORS: 0
VISUAL CHANGE: 0
POLYDIPSIA: 0
WEIGHT LOSS: 0
CONFUSION: 0
WEAKNESS: 0
SPEECH DIFFICULTY: 0
SWEATS: 0
HEADACHES: 0
BLACKOUTS: 0
NERVOUS/ANXIOUS: 0
SEIZURES: 0
POLYPHAGIA: 0
BLURRED VISION: 0
DIZZINESS: 0
FATIGUE: 0

## 2025-08-22 LAB
ANION GAP SERPL CALCULATED.4IONS-SCNC: 11 MMOL/L (CALC) (ref 7–17)
BUN SERPL-MCNC: 20 MG/DL (ref 7–25)
BUN/CREAT SERPL: NORMAL (CALC) (ref 6–22)
CALCIUM SERPL-MCNC: 9.4 MG/DL (ref 8.6–10.4)
CHLORIDE SERPL-SCNC: 100 MMOL/L (ref 98–110)
CO2 SERPL-SCNC: 27 MMOL/L (ref 20–32)
CREAT SERPL-MCNC: 0.63 MG/DL (ref 0.5–1.03)
EGFRCR SERPLBLD CKD-EPI 2021: 104 ML/MIN/1.73M2
GLUCOSE SERPL-MCNC: 92 MG/DL (ref 65–99)
POTASSIUM SERPL-SCNC: 4.3 MMOL/L (ref 3.5–5.3)
SODIUM SERPL-SCNC: 138 MMOL/L (ref 135–146)

## 2025-08-28 ENCOUNTER — APPOINTMENT (OUTPATIENT)
Dept: PRIMARY CARE | Facility: CLINIC | Age: 55
End: 2025-08-28
Payer: COMMERCIAL

## 2025-08-28 VITALS
HEART RATE: 82 BPM | WEIGHT: 181 LBS | DIASTOLIC BLOOD PRESSURE: 77 MMHG | BODY MASS INDEX: 30.12 KG/M2 | OXYGEN SATURATION: 97 % | SYSTOLIC BLOOD PRESSURE: 116 MMHG

## 2025-08-28 DIAGNOSIS — E11.9 TYPE 2 DIABETES MELLITUS WITHOUT COMPLICATION, UNSPECIFIED WHETHER LONG TERM INSULIN USE: ICD-10-CM

## 2025-08-28 DIAGNOSIS — I10 ESSENTIAL HYPERTENSION: ICD-10-CM

## 2025-08-28 DIAGNOSIS — E78.2 MIXED HYPERLIPIDEMIA: ICD-10-CM

## 2025-08-28 DIAGNOSIS — F41.1 GAD (GENERALIZED ANXIETY DISORDER): ICD-10-CM

## 2025-08-28 DIAGNOSIS — Z23 IMMUNIZATION DUE: Primary | ICD-10-CM

## 2025-08-28 PROCEDURE — 3074F SYST BP LT 130 MM HG: CPT | Performed by: INTERNAL MEDICINE

## 2025-08-28 PROCEDURE — 3078F DIAST BP <80 MM HG: CPT | Performed by: INTERNAL MEDICINE

## 2025-08-28 PROCEDURE — 4010F ACE/ARB THERAPY RXD/TAKEN: CPT | Performed by: INTERNAL MEDICINE

## 2025-08-28 PROCEDURE — 90471 IMMUNIZATION ADMIN: CPT | Performed by: INTERNAL MEDICINE

## 2025-08-28 PROCEDURE — 90679 RSV VACC PREF RECOMB ADJT IM: CPT | Performed by: INTERNAL MEDICINE

## 2025-08-28 PROCEDURE — 99214 OFFICE O/P EST MOD 30 MIN: CPT | Performed by: INTERNAL MEDICINE

## 2025-08-28 RX ORDER — TIRZEPATIDE 5 MG/.5ML
5 INJECTION, SOLUTION SUBCUTANEOUS WEEKLY
Qty: 2 ML | Refills: 0 | Status: SHIPPED | OUTPATIENT
Start: 2025-08-28

## 2025-08-28 ASSESSMENT — PATIENT HEALTH QUESTIONNAIRE - PHQ9
1. LITTLE INTEREST OR PLEASURE IN DOING THINGS: NOT AT ALL
2. FEELING DOWN, DEPRESSED OR HOPELESS: NOT AT ALL
SUM OF ALL RESPONSES TO PHQ9 QUESTIONS 1 AND 2: 0

## 2025-08-28 ASSESSMENT — PAIN SCALES - GENERAL: PAINLEVEL_OUTOF10: 0-NO PAIN

## 2025-12-02 ENCOUNTER — APPOINTMENT (OUTPATIENT)
Dept: PRIMARY CARE | Facility: CLINIC | Age: 55
End: 2025-12-02
Payer: COMMERCIAL

## 2025-12-23 ENCOUNTER — APPOINTMENT (OUTPATIENT)
Dept: PRIMARY CARE | Facility: CLINIC | Age: 55
End: 2025-12-23
Payer: COMMERCIAL

## 2026-04-10 ENCOUNTER — APPOINTMENT (OUTPATIENT)
Facility: CLINIC | Age: 56
End: 2026-04-10
Payer: COMMERCIAL

## (undated) DEVICE — TUBESET, HIGH FLOW II, HEATED, W/RTP, PNEUMO SURE

## (undated) DEVICE — SUTURE, MONOCRYL, 4-0, 18 IN, PS2, UNDYED

## (undated) DEVICE — LUBRICANT, ELECTROLUBE, F/ELECTRODE TIPS

## (undated) DEVICE — SUTURE, V-LOC, 3-0, 9IN, CV-23, GREEN

## (undated) DEVICE — COVER HANDLE LIGHT, STERIS, BLUE, STERILE

## (undated) DEVICE — SUTURE, VICRYL, 0, 27 IN, UR-6, VIOLET

## (undated) DEVICE — CARE KIT, LAPAROSCOPIC, ADVANCED

## (undated) DEVICE — ACCESSORY, AVETA, WASTE MANAGEMENT WITH CAP

## (undated) DEVICE — SUTURE, VICRYL, 3-0, 27 IN, SH, VIOLET

## (undated) DEVICE — Device

## (undated) DEVICE — DRAPE, ARM XI

## (undated) DEVICE — DRAPE, UNDERBUTTOCKS, W/FLUID CONTROL POUCH

## (undated) DEVICE — DEVICE, RESECTING, SMOL,  WITH HANDSET AVETA, 2.9MM

## (undated) DEVICE — TUBING, SUCTION, CONNECTING, STERILE 0.25 X 120 IN., LF

## (undated) DEVICE — TOWEL, SURGICAL, NEURO, O/R, 16 X 26, BLUE, STERILE

## (undated) DEVICE — SUTURE, V-LOC, 180 0/0, GR9, GS21

## (undated) DEVICE — SPONGE, GAUZE, XRAY DECT, 16 PLY, 4 X 4, W/MASTER DMT,STERILE

## (undated) DEVICE — SOLUTION, IRRIGATION, SODIUM CHLORIDE 0.9%, 1000 ML, POUR BOTTLE

## (undated) DEVICE — DRAPE, COLUMN, DAVINCI XI

## (undated) DEVICE — GLOVE, SURGICAL, PROTEXIS PI ORTHO, 7.0, PF, LF

## (undated) DEVICE — ADHESIVE, SKIN, LIQUIBAND EXCEED

## (undated) DEVICE — SEAL, UNIVERSAL 5-8MM  XI

## (undated) DEVICE — OBTURATOR, BLADELESS , SU

## (undated) DEVICE — ACCESSORY, FLUID MANAGEMENT, AVETA

## (undated) DEVICE — TROCAR SYSTEM, BALLOON, KII GELPORT, 12 X 130MM

## (undated) DEVICE — GLOVE, SURGICAL, PROTEXIS PI , 7.0, PF, LF

## (undated) DEVICE — ABDOMINAL BINDER, 3-PANEL, 9, 72 - 84""

## (undated) DEVICE — SOLUTION, IRRIGATION, USP, STERILE WATER, 500ML, BOTTLE

## (undated) DEVICE — DRAPE, PAD, PREP, W/ 9 IN CUFF, 24 X 41, LF, NS

## (undated) DEVICE — PREP TRAY, SKIN, DRY, STANDARD

## (undated) DEVICE — COVER, TIP HOT SHEARS ENDOWRIST

## (undated) DEVICE — SUTURE, V-LOC, 2-0, 12IN, GS-21, GR 180 ABS